# Patient Record
Sex: MALE | Race: BLACK OR AFRICAN AMERICAN | Employment: FULL TIME | URBAN - METROPOLITAN AREA
[De-identification: names, ages, dates, MRNs, and addresses within clinical notes are randomized per-mention and may not be internally consistent; named-entity substitution may affect disease eponyms.]

---

## 2017-12-26 PROBLEM — J69.0 ACUTE ASPIRATION PNEUMONIA (HCC): Status: ACTIVE | Noted: 2017-01-01

## 2018-01-01 ENCOUNTER — HOSPITAL ENCOUNTER (OUTPATIENT)
Dept: WOUND CARE | Age: 68
Discharge: HOME | End: 2018-05-23
Attending: SURGERY | Admitting: SURGERY

## 2018-01-01 ENCOUNTER — TELEPHONE (OUTPATIENT)
Dept: ENT CLINIC | Age: 68
End: 2018-01-01

## 2018-01-01 ENCOUNTER — APPOINTMENT (OUTPATIENT)
Dept: GENERAL RADIOLOGY | Age: 68
DRG: 280 | End: 2018-01-01
Payer: MEDICARE

## 2018-01-01 ENCOUNTER — TELEPHONE (OUTPATIENT)
Dept: CARDIOLOGY CLINIC | Age: 68
End: 2018-01-01

## 2018-01-01 ENCOUNTER — HOSPITAL ENCOUNTER (INPATIENT)
Dept: TELEMETRY | Age: 68
LOS: 4 days | Discharge: NURSING, REHAB OR LONG-TERM CARE | DRG: 280 | End: 2018-07-17
Attending: EMERGENCY MEDICINE | Admitting: INTERNAL MEDICINE
Payer: MEDICARE

## 2018-01-01 ENCOUNTER — OFFICE VISIT (OUTPATIENT)
Dept: ENT CLINIC | Age: 68
End: 2018-01-01

## 2018-01-01 ENCOUNTER — HOSPITAL ENCOUNTER (OUTPATIENT)
Dept: WOUND CARE | Age: 68
Discharge: HOME | End: 2018-04-10
Attending: SURGERY | Admitting: SURGERY

## 2018-01-01 ENCOUNTER — HOSPITAL ENCOUNTER (OUTPATIENT)
Dept: WOUND CARE | Age: 68
Discharge: HOME | End: 2018-04-19
Attending: INTERNAL MEDICINE | Admitting: INTERNAL MEDICINE

## 2018-01-01 ENCOUNTER — HOSPITAL ENCOUNTER (OUTPATIENT)
Dept: WOUND CARE | Age: 68
Discharge: HOME | End: 2018-04-18
Attending: SURGERY | Admitting: SURGERY

## 2018-01-01 VITALS
HEIGHT: 72 IN | BODY MASS INDEX: 33.89 KG/M2 | RESPIRATION RATE: 18 BRPM | SYSTOLIC BLOOD PRESSURE: 131 MMHG | HEART RATE: 91 BPM | DIASTOLIC BLOOD PRESSURE: 74 MMHG | OXYGEN SATURATION: 97 % | WEIGHT: 250.22 LBS | TEMPERATURE: 98.9 F

## 2018-01-01 VITALS
TEMPERATURE: 97 F | HEART RATE: 64 BPM | BODY MASS INDEX: 39.65 KG/M2 | SYSTOLIC BLOOD PRESSURE: 170 MMHG | RESPIRATION RATE: 22 BRPM | WEIGHT: 277 LBS | DIASTOLIC BLOOD PRESSURE: 77 MMHG | HEIGHT: 70 IN

## 2018-01-01 VITALS
WEIGHT: 201 LBS | HEIGHT: 70 IN | SYSTOLIC BLOOD PRESSURE: 155 MMHG | HEART RATE: 54 BPM | DIASTOLIC BLOOD PRESSURE: 70 MMHG | BODY MASS INDEX: 28.77 KG/M2

## 2018-01-01 VITALS
TEMPERATURE: 97.7 F | RESPIRATION RATE: 20 BRPM | SYSTOLIC BLOOD PRESSURE: 156 MMHG | HEART RATE: 63 BPM | DIASTOLIC BLOOD PRESSURE: 68 MMHG

## 2018-01-01 DIAGNOSIS — L89.153 DECUBITUS ULCER OF SACRAL REGION, STAGE 3 (HCC): ICD-10-CM

## 2018-01-01 DIAGNOSIS — R09.02 HYPOXIA: ICD-10-CM

## 2018-01-01 DIAGNOSIS — R13.14 PHARYNGOESOPHAGEAL DYSPHAGIA: ICD-10-CM

## 2018-01-01 DIAGNOSIS — J38.01 VOCAL CORD PARALYSIS, UNILATERAL COMPLETE: Primary | ICD-10-CM

## 2018-01-01 DIAGNOSIS — I21.4 NSTEMI (NON-ST ELEVATED MYOCARDIAL INFARCTION) (HCC): ICD-10-CM

## 2018-01-01 DIAGNOSIS — I50.9 CONGESTIVE HEART FAILURE, UNSPECIFIED CONGESTIVE HEART FAILURE CHRONICITY, UNSPECIFIED CONGESTIVE HEART FAILURE TYPE: ICD-10-CM

## 2018-01-01 DIAGNOSIS — R41.82 ALTERED MENTAL STATUS, UNSPECIFIED ALTERED MENTAL STATUS TYPE: Primary | ICD-10-CM

## 2018-01-01 LAB
A/G RATIO: 0.6 (ref 1.1–2.2)
A/G RATIO: 0.6 (ref 1.1–2.2)
ACETAMINOPHEN LEVEL: <5 UG/ML (ref 10–30)
ALBUMIN SERPL-MCNC: 3.1 G/DL (ref 3.4–5)
ALBUMIN SERPL-MCNC: 3.4 G/DL (ref 3.4–5)
ALBUMIN SERPL-MCNC: 3.4 G/DL (ref 3.4–5)
ALP BLD-CCNC: 106 U/L (ref 40–129)
ALP BLD-CCNC: 110 U/L (ref 40–129)
ALP BLD-CCNC: 91 U/L (ref 40–129)
ALT SERPL-CCNC: 13 U/L (ref 10–40)
ALT SERPL-CCNC: 15 U/L (ref 10–40)
ALT SERPL-CCNC: 16 U/L (ref 10–40)
AMMONIA: 36 UMOL/L (ref 16–60)
AMPHETAMINE SCREEN, URINE: NORMAL
ANION GAP SERPL CALCULATED.3IONS-SCNC: 12 MMOL/L (ref 3–16)
ANION GAP SERPL CALCULATED.3IONS-SCNC: 14 MMOL/L (ref 3–16)
AST SERPL-CCNC: 16 U/L (ref 15–37)
AST SERPL-CCNC: 18 U/L (ref 15–37)
AST SERPL-CCNC: 25 U/L (ref 15–37)
BARBITURATE SCREEN URINE: NORMAL
BASE EXCESS VENOUS: 14 (ref -3–3)
BASOPHILS ABSOLUTE: 0 K/UL (ref 0–0.2)
BASOPHILS ABSOLUTE: 0.1 K/UL (ref 0–0.2)
BASOPHILS RELATIVE PERCENT: 0.3 %
BASOPHILS RELATIVE PERCENT: 0.4 %
BASOPHILS RELATIVE PERCENT: 0.6 %
BASOPHILS RELATIVE PERCENT: 1.2 %
BENZODIAZEPINE SCREEN, URINE: NORMAL
BILIRUB SERPL-MCNC: 0.3 MG/DL (ref 0–1)
BILIRUBIN DIRECT: <0.2 MG/DL (ref 0–0.3)
BILIRUBIN DIRECT: <0.2 MG/DL (ref 0–0.3)
BILIRUBIN URINE: NEGATIVE
BILIRUBIN, INDIRECT: ABNORMAL MG/DL (ref 0–1)
BILIRUBIN, INDIRECT: NORMAL MG/DL (ref 0–1)
BLOOD CULTURE, ROUTINE: NORMAL
BLOOD, URINE: ABNORMAL
BUN BLDV-MCNC: 78 MG/DL (ref 7–20)
BUN BLDV-MCNC: 80 MG/DL (ref 7–20)
BUN BLDV-MCNC: 80 MG/DL (ref 7–20)
BUN BLDV-MCNC: 81 MG/DL (ref 7–20)
C-REACTIVE PROTEIN: 23.4 MG/L (ref 0–5.1)
CALCIUM SERPL-MCNC: 9.7 MG/DL (ref 8.3–10.6)
CALCIUM SERPL-MCNC: 9.9 MG/DL (ref 8.3–10.6)
CANNABINOID SCREEN URINE: NORMAL
CHLORIDE BLD-SCNC: 100 MMOL/L (ref 99–110)
CHLORIDE BLD-SCNC: 102 MMOL/L (ref 99–110)
CHLORIDE BLD-SCNC: 97 MMOL/L (ref 99–110)
CHLORIDE BLD-SCNC: 98 MMOL/L (ref 99–110)
CLARITY: CLEAR
CO2: 31 MMOL/L (ref 21–32)
CO2: 31 MMOL/L (ref 21–32)
CO2: 32 MMOL/L (ref 21–32)
CO2: 33 MMOL/L (ref 21–32)
COCAINE METABOLITE SCREEN URINE: NORMAL
COLOR: YELLOW
CREAT SERPL-MCNC: 1.4 MG/DL (ref 0.8–1.3)
CREAT SERPL-MCNC: 1.5 MG/DL (ref 0.8–1.3)
CREAT SERPL-MCNC: 1.5 MG/DL (ref 0.8–1.3)
CREAT SERPL-MCNC: 1.6 MG/DL (ref 0.8–1.3)
CREATININE URINE: 33 MG/DL (ref 39–259)
CULTURE, BLOOD 2: NORMAL
D DIMER: 668 NG/ML DDU (ref 0–229)
DIGOXIN LEVEL: 1.3 NG/ML (ref 0.8–2)
EKG ATRIAL RATE: 252 BPM
EKG ATRIAL RATE: 256 BPM
EKG DIAGNOSIS: NORMAL
EKG DIAGNOSIS: NORMAL
EKG P AXIS: -76 DEGREES
EKG P AXIS: -86 DEGREES
EKG Q-T INTERVAL: 272 MS
EKG Q-T INTERVAL: 342 MS
EKG QRS DURATION: 78 MS
EKG QRS DURATION: 88 MS
EKG QTC CALCULATION (BAZETT): 349 MS
EKG QTC CALCULATION (BAZETT): 364 MS
EKG R AXIS: -34 DEGREES
EKG R AXIS: -50 DEGREES
EKG T AXIS: 255 DEGREES
EKG T AXIS: 270 DEGREES
EKG VENTRICULAR RATE: 108 BPM
EKG VENTRICULAR RATE: 63 BPM
EOSINOPHILS ABSOLUTE: 0.2 K/UL (ref 0–0.6)
EOSINOPHILS ABSOLUTE: 0.2 K/UL (ref 0–0.6)
EOSINOPHILS ABSOLUTE: 0.3 K/UL (ref 0–0.6)
EOSINOPHILS ABSOLUTE: 0.3 K/UL (ref 0–0.6)
EOSINOPHILS RELATIVE PERCENT: 2.3 %
EOSINOPHILS RELATIVE PERCENT: 2.6 %
EOSINOPHILS RELATIVE PERCENT: 3.2 %
EOSINOPHILS RELATIVE PERCENT: 4.2 %
ETHANOL: NORMAL MG/DL (ref 0–0.08)
GFR AFRICAN AMERICAN: 52
GFR AFRICAN AMERICAN: 56
GFR AFRICAN AMERICAN: 56
GFR AFRICAN AMERICAN: >60
GFR NON-AFRICAN AMERICAN: 43
GFR NON-AFRICAN AMERICAN: 47
GFR NON-AFRICAN AMERICAN: 47
GFR NON-AFRICAN AMERICAN: 51
GLOBULIN: 5.6 G/DL
GLOBULIN: 6.1 G/DL
GLUCOSE BLD-MCNC: 101 MG/DL (ref 70–99)
GLUCOSE BLD-MCNC: 102 MG/DL (ref 70–99)
GLUCOSE BLD-MCNC: 121 MG/DL (ref 70–99)
GLUCOSE BLD-MCNC: 146 MG/DL (ref 70–99)
GLUCOSE BLD-MCNC: 167 MG/DL (ref 70–99)
GLUCOSE BLD-MCNC: 177 MG/DL (ref 70–99)
GLUCOSE BLD-MCNC: 179 MG/DL (ref 70–99)
GLUCOSE BLD-MCNC: 180 MG/DL (ref 70–99)
GLUCOSE BLD-MCNC: 184 MG/DL (ref 70–99)
GLUCOSE BLD-MCNC: 184 MG/DL (ref 70–99)
GLUCOSE BLD-MCNC: 188 MG/DL (ref 70–99)
GLUCOSE BLD-MCNC: 188 MG/DL (ref 70–99)
GLUCOSE BLD-MCNC: 190 MG/DL (ref 70–99)
GLUCOSE BLD-MCNC: 194 MG/DL (ref 70–99)
GLUCOSE BLD-MCNC: 215 MG/DL (ref 70–99)
GLUCOSE BLD-MCNC: 223 MG/DL (ref 70–99)
GLUCOSE BLD-MCNC: 237 MG/DL (ref 70–99)
GLUCOSE BLD-MCNC: 243 MG/DL (ref 70–99)
GLUCOSE BLD-MCNC: 78 MG/DL (ref 70–99)
GLUCOSE BLD-MCNC: 83 MG/DL (ref 70–99)
GLUCOSE BLD-MCNC: 97 MG/DL (ref 70–99)
GLUCOSE URINE: NEGATIVE MG/DL
HCO3 VENOUS: 37.3 MMOL/L (ref 23–29)
HCT VFR BLD CALC: 25.2 % (ref 40.5–52.5)
HCT VFR BLD CALC: 25.5 % (ref 40.5–52.5)
HCT VFR BLD CALC: 27.4 % (ref 40.5–52.5)
HCT VFR BLD CALC: 27.4 % (ref 40.5–52.5)
HEMOGLOBIN: 8.2 G/DL (ref 13.5–17.5)
HEMOGLOBIN: 8.4 G/DL (ref 13.5–17.5)
HEMOGLOBIN: 8.6 G/DL (ref 13.5–17.5)
HEMOGLOBIN: 8.8 G/DL (ref 13.5–17.5)
INR BLD: 1.48 (ref 0.86–1.14)
INR BLD: 1.73 (ref 0.86–1.14)
KETONES, URINE: NEGATIVE MG/DL
LACTATE: 1.45 MMOL/L (ref 0.4–2)
LACTIC ACID: 1.1 MMOL/L (ref 0.4–2)
LACTIC ACID: 1.3 MMOL/L (ref 0.4–2)
LEUKOCYTE ESTERASE, URINE: NEGATIVE
LIPASE: 74 U/L (ref 13–60)
LV EF: 40 %
LVEF MODALITY: NORMAL
LYMPHOCYTES ABSOLUTE: 1.5 K/UL (ref 1–5.1)
LYMPHOCYTES ABSOLUTE: 1.6 K/UL (ref 1–5.1)
LYMPHOCYTES ABSOLUTE: 1.8 K/UL (ref 1–5.1)
LYMPHOCYTES ABSOLUTE: 2.4 K/UL (ref 1–5.1)
LYMPHOCYTES RELATIVE PERCENT: 17.6 %
LYMPHOCYTES RELATIVE PERCENT: 20.7 %
LYMPHOCYTES RELATIVE PERCENT: 22.1 %
LYMPHOCYTES RELATIVE PERCENT: 25.3 %
Lab: NORMAL
MAGNESIUM: 2.3 MG/DL (ref 1.8–2.4)
MAGNESIUM: 2.3 MG/DL (ref 1.8–2.4)
MCH RBC QN AUTO: 26.5 PG (ref 26–34)
MCH RBC QN AUTO: 26.5 PG (ref 26–34)
MCH RBC QN AUTO: 26.9 PG (ref 26–34)
MCH RBC QN AUTO: 27 PG (ref 26–34)
MCHC RBC AUTO-ENTMCNC: 31.5 G/DL (ref 31–36)
MCHC RBC AUTO-ENTMCNC: 32 G/DL (ref 31–36)
MCHC RBC AUTO-ENTMCNC: 32.6 G/DL (ref 31–36)
MCHC RBC AUTO-ENTMCNC: 32.9 G/DL (ref 31–36)
MCV RBC AUTO: 82.3 FL (ref 80–100)
MCV RBC AUTO: 82.3 FL (ref 80–100)
MCV RBC AUTO: 82.9 FL (ref 80–100)
MCV RBC AUTO: 83.9 FL (ref 80–100)
METHADONE SCREEN, URINE: NORMAL
MICROSCOPIC EXAMINATION: YES
MONOCYTES ABSOLUTE: 0.6 K/UL (ref 0–1.3)
MONOCYTES ABSOLUTE: 0.6 K/UL (ref 0–1.3)
MONOCYTES ABSOLUTE: 0.7 K/UL (ref 0–1.3)
MONOCYTES ABSOLUTE: 0.7 K/UL (ref 0–1.3)
MONOCYTES RELATIVE PERCENT: 6.8 %
MONOCYTES RELATIVE PERCENT: 7.8 %
MONOCYTES RELATIVE PERCENT: 8.2 %
MONOCYTES RELATIVE PERCENT: 8.9 %
NEUTROPHILS ABSOLUTE: 5.3 K/UL (ref 1.7–7.7)
NEUTROPHILS ABSOLUTE: 5.3 K/UL (ref 1.7–7.7)
NEUTROPHILS ABSOLUTE: 6 K/UL (ref 1.7–7.7)
NEUTROPHILS ABSOLUTE: 6.1 K/UL (ref 1.7–7.7)
NEUTROPHILS RELATIVE PERCENT: 64.4 %
NEUTROPHILS RELATIVE PERCENT: 65.3 %
NEUTROPHILS RELATIVE PERCENT: 67.5 %
NEUTROPHILS RELATIVE PERCENT: 70.6 %
NITRITE, URINE: NEGATIVE
O2 SAT, VEN: 84 %
OPIATE SCREEN URINE: NORMAL
OXYCODONE URINE: NORMAL
PCO2, VEN: 47.5 MM HG (ref 40–50)
PDW BLD-RTO: 18 % (ref 12.4–15.4)
PDW BLD-RTO: 18.1 % (ref 12.4–15.4)
PDW BLD-RTO: 18.2 % (ref 12.4–15.4)
PDW BLD-RTO: 18.5 % (ref 12.4–15.4)
PERFORMED ON: ABNORMAL
PERFORMED ON: NORMAL
PH UA: 7
PH UA: 7
PH VENOUS: 7.5 (ref 7.35–7.45)
PHENCYCLIDINE SCREEN URINE: NORMAL
PHOSPHORUS: 3.4 MG/DL (ref 2.5–4.9)
PLATELET # BLD: 184 K/UL (ref 135–450)
PLATELET # BLD: 184 K/UL (ref 135–450)
PLATELET # BLD: 198 K/UL (ref 135–450)
PLATELET # BLD: 219 K/UL (ref 135–450)
PMV BLD AUTO: 10.1 FL (ref 5–10.5)
PMV BLD AUTO: 10.1 FL (ref 5–10.5)
PMV BLD AUTO: 10.4 FL (ref 5–10.5)
PMV BLD AUTO: 10.6 FL (ref 5–10.5)
PO2, VEN: 45 MM HG
POC SAMPLE TYPE: ABNORMAL
POC SAMPLE TYPE: NORMAL
POTASSIUM REFLEX MAGNESIUM: 4.2 MMOL/L (ref 3.5–5.1)
POTASSIUM REFLEX MAGNESIUM: 4.3 MMOL/L (ref 3.5–5.1)
POTASSIUM REFLEX MAGNESIUM: 5.1 MMOL/L (ref 3.5–5.1)
POTASSIUM REFLEX MAGNESIUM: 5.2 MMOL/L (ref 3.5–5.1)
POTASSIUM REFLEX MAGNESIUM: 5.2 MMOL/L (ref 3.5–5.1)
PRO-BNP: 1835 PG/ML (ref 0–124)
PRO-BNP: 1851 PG/ML (ref 0–124)
PRO-BNP: 2781 PG/ML (ref 0–124)
PRO-BNP: 3035 PG/ML (ref 0–124)
PROCALCITONIN: 0.71 NG/ML (ref 0–0.15)
PROPOXYPHENE SCREEN: NORMAL
PROTEIN PROTEIN: 67.9 MG/DL
PROTEIN UA: 100 MG/DL
PROTHROMBIN TIME: 16.9 SEC (ref 9.8–13)
PROTHROMBIN TIME: 19.7 SEC (ref 9.8–13)
RBC # BLD: 3.06 M/UL (ref 4.2–5.9)
RBC # BLD: 3.09 M/UL (ref 4.2–5.9)
RBC # BLD: 3.27 M/UL (ref 4.2–5.9)
RBC # BLD: 3.3 M/UL (ref 4.2–5.9)
RBC UA: ABNORMAL /HPF (ref 0–2)
SALICYLATE, SERUM: <0.3 MG/DL (ref 15–30)
SEDIMENTATION RATE, ERYTHROCYTE: >120 MM/HR (ref 0–20)
SODIUM BLD-SCNC: 140 MMOL/L (ref 136–145)
SODIUM BLD-SCNC: 142 MMOL/L (ref 136–145)
SODIUM BLD-SCNC: 145 MMOL/L (ref 136–145)
SODIUM BLD-SCNC: 147 MMOL/L (ref 136–145)
SPECIFIC GRAVITY UA: 1.01
T4 FREE: 1.3 NG/DL (ref 0.9–1.8)
TCO2 CALC VENOUS: 39 MMOL/L
TOTAL CK: 110 U/L (ref 39–308)
TOTAL PROTEIN: 8.1 G/DL (ref 6.4–8.2)
TOTAL PROTEIN: 9 G/DL (ref 6.4–8.2)
TOTAL PROTEIN: 9.5 G/DL (ref 6.4–8.2)
TROPONIN: 0.29 NG/ML
TROPONIN: 0.31 NG/ML
TROPONIN: 0.4 NG/ML
TSH SERPL DL<=0.05 MIU/L-ACNC: 1.45 UIU/ML (ref 0.27–4.2)
URINE CULTURE, ROUTINE: NORMAL
URINE CULTURE, ROUTINE: NORMAL
URINE TYPE: ABNORMAL
UROBILINOGEN, URINE: 0.2 E.U./DL
WBC # BLD: 7.9 K/UL (ref 4–11)
WBC # BLD: 8.2 K/UL (ref 4–11)
WBC # BLD: 8.4 K/UL (ref 4–11)
WBC # BLD: 9.4 K/UL (ref 4–11)
WBC UA: ABNORMAL /HPF (ref 0–5)

## 2018-01-01 PROCEDURE — 85652 RBC SED RATE AUTOMATED: CPT

## 2018-01-01 PROCEDURE — 6360000002 HC RX W HCPCS: Performed by: INTERNAL MEDICINE

## 2018-01-01 PROCEDURE — 83880 ASSAY OF NATRIURETIC PEPTIDE: CPT

## 2018-01-01 PROCEDURE — 1200000000 HC SEMI PRIVATE

## 2018-01-01 PROCEDURE — 71250 CT THORAX DX C-: CPT

## 2018-01-01 PROCEDURE — 93005 ELECTROCARDIOGRAM TRACING: CPT

## 2018-01-01 PROCEDURE — 6360000004 HC RX CONTRAST MEDICATION: Performed by: INTERNAL MEDICINE

## 2018-01-01 PROCEDURE — 97530 THERAPEUTIC ACTIVITIES: CPT

## 2018-01-01 PROCEDURE — 84484 ASSAY OF TROPONIN QUANT: CPT

## 2018-01-01 PROCEDURE — G8983 BODY POS D/C STATUS: HCPCS

## 2018-01-01 PROCEDURE — C9113 INJ PANTOPRAZOLE SODIUM, VIA: HCPCS

## 2018-01-01 PROCEDURE — 97165 OT EVAL LOW COMPLEX 30 MIN: CPT

## 2018-01-01 PROCEDURE — G8982 BODY POS GOAL STATUS: HCPCS

## 2018-01-01 PROCEDURE — 70498 CT ANGIOGRAPHY NECK: CPT

## 2018-01-01 PROCEDURE — 2700000000 HC OXYGEN THERAPY PER DAY

## 2018-01-01 PROCEDURE — 93005 ELECTROCARDIOGRAM TRACING: CPT | Performed by: STUDENT IN AN ORGANIZED HEALTH CARE EDUCATION/TRAINING PROGRAM

## 2018-01-01 PROCEDURE — 71045 X-RAY EXAM CHEST 1 VIEW: CPT

## 2018-01-01 PROCEDURE — 87086 URINE CULTURE/COLONY COUNT: CPT

## 2018-01-01 PROCEDURE — 9990 CHARGE CONVERSION

## 2018-01-01 PROCEDURE — 84145 PROCALCITONIN (PCT): CPT

## 2018-01-01 PROCEDURE — 70496 CT ANGIOGRAPHY HEAD: CPT

## 2018-01-01 PROCEDURE — 36415 COLL VENOUS BLD VENIPUNCTURE: CPT

## 2018-01-01 PROCEDURE — 85610 PROTHROMBIN TIME: CPT

## 2018-01-01 PROCEDURE — 80048 BASIC METABOLIC PNL TOTAL CA: CPT

## 2018-01-01 PROCEDURE — 6370000000 HC RX 637 (ALT 250 FOR IP): Performed by: STUDENT IN AN ORGANIZED HEALTH CARE EDUCATION/TRAINING PROGRAM

## 2018-01-01 PROCEDURE — 80053 COMPREHEN METABOLIC PANEL: CPT

## 2018-01-01 PROCEDURE — 82803 BLOOD GASES ANY COMBINATION: CPT

## 2018-01-01 PROCEDURE — 2580000003 HC RX 258: Performed by: STUDENT IN AN ORGANIZED HEALTH CARE EDUCATION/TRAINING PROGRAM

## 2018-01-01 PROCEDURE — 84100 ASSAY OF PHOSPHORUS: CPT

## 2018-01-01 PROCEDURE — C9113 INJ PANTOPRAZOLE SODIUM, VIA: HCPCS | Performed by: STUDENT IN AN ORGANIZED HEALTH CARE EDUCATION/TRAINING PROGRAM

## 2018-01-01 PROCEDURE — 99233 SBSQ HOSP IP/OBS HIGH 50: CPT | Performed by: INTERNAL MEDICINE

## 2018-01-01 PROCEDURE — 6360000002 HC RX W HCPCS: Performed by: STUDENT IN AN ORGANIZED HEALTH CARE EDUCATION/TRAINING PROGRAM

## 2018-01-01 PROCEDURE — 82550 ASSAY OF CK (CPK): CPT

## 2018-01-01 PROCEDURE — 6370000000 HC RX 637 (ALT 250 FOR IP): Performed by: INTERNAL MEDICINE

## 2018-01-01 PROCEDURE — 85379 FIBRIN DEGRADATION QUANT: CPT

## 2018-01-01 PROCEDURE — 85025 COMPLETE CBC W/AUTO DIFF WBC: CPT

## 2018-01-01 PROCEDURE — 51702 INSERT TEMP BLADDER CATH: CPT

## 2018-01-01 PROCEDURE — 82140 ASSAY OF AMMONIA: CPT

## 2018-01-01 PROCEDURE — 84132 ASSAY OF SERUM POTASSIUM: CPT

## 2018-01-01 PROCEDURE — 80162 ASSAY OF DIGOXIN TOTAL: CPT

## 2018-01-01 PROCEDURE — 5A09457 ASSISTANCE WITH RESPIRATORY VENTILATION, 24-96 CONSECUTIVE HOURS, CONTINUOUS POSITIVE AIRWAY PRESSURE: ICD-10-PCS | Performed by: INTERNAL MEDICINE

## 2018-01-01 PROCEDURE — 94664 DEMO&/EVAL PT USE INHALER: CPT

## 2018-01-01 PROCEDURE — 80307 DRUG TEST PRSMV CHEM ANLYZR: CPT

## 2018-01-01 PROCEDURE — 84439 ASSAY OF FREE THYROXINE: CPT

## 2018-01-01 PROCEDURE — 83735 ASSAY OF MAGNESIUM: CPT

## 2018-01-01 PROCEDURE — 99223 1ST HOSP IP/OBS HIGH 75: CPT | Performed by: INTERNAL MEDICINE

## 2018-01-01 PROCEDURE — 94761 N-INVAS EAR/PLS OXIMETRY MLT: CPT

## 2018-01-01 PROCEDURE — 73630 X-RAY EXAM OF FOOT: CPT

## 2018-01-01 PROCEDURE — 31720 CLEARANCE OF AIRWAYS: CPT

## 2018-01-01 PROCEDURE — 97110 THERAPEUTIC EXERCISES: CPT

## 2018-01-01 PROCEDURE — 31575 DIAGNOSTIC LARYNGOSCOPY: CPT | Performed by: OTOLARYNGOLOGY

## 2018-01-01 PROCEDURE — 94640 AIRWAY INHALATION TREATMENT: CPT

## 2018-01-01 PROCEDURE — G8981 BODY POS CURRENT STATUS: HCPCS

## 2018-01-01 PROCEDURE — 81001 URINALYSIS AUTO W/SCOPE: CPT

## 2018-01-01 PROCEDURE — 99285 EMERGENCY DEPT VISIT HI MDM: CPT

## 2018-01-01 PROCEDURE — C8929 TTE W OR WO FOL WCON,DOPPLER: HCPCS | Performed by: INTERNAL MEDICINE

## 2018-01-01 PROCEDURE — 97162 PT EVAL MOD COMPLEX 30 MIN: CPT

## 2018-01-01 PROCEDURE — G8988 SELF CARE GOAL STATUS: HCPCS

## 2018-01-01 PROCEDURE — 83605 ASSAY OF LACTIC ACID: CPT

## 2018-01-01 PROCEDURE — 86140 C-REACTIVE PROTEIN: CPT

## 2018-01-01 PROCEDURE — 87040 BLOOD CULTURE FOR BACTERIA: CPT

## 2018-01-01 PROCEDURE — 99203 OFFICE O/P NEW LOW 30 MIN: CPT | Performed by: OTOLARYNGOLOGY

## 2018-01-01 PROCEDURE — 84443 ASSAY THYROID STIM HORMONE: CPT

## 2018-01-01 PROCEDURE — G8989 SELF CARE D/C STATUS: HCPCS

## 2018-01-01 PROCEDURE — 70450 CT HEAD/BRAIN W/O DYE: CPT

## 2018-01-01 PROCEDURE — 11042 DBRDMT SUBQ TIS 1ST 20SQCM/<: CPT | Performed by: INTERNAL MEDICINE

## 2018-01-01 PROCEDURE — 82570 ASSAY OF URINE CREATININE: CPT

## 2018-01-01 PROCEDURE — G8987 SELF CARE CURRENT STATUS: HCPCS

## 2018-01-01 PROCEDURE — 83690 ASSAY OF LIPASE: CPT

## 2018-01-01 PROCEDURE — G0480 DRUG TEST DEF 1-7 CLASSES: HCPCS

## 2018-01-01 PROCEDURE — 84156 ASSAY OF PROTEIN URINE: CPT

## 2018-01-01 RX ORDER — NICOTINE POLACRILEX 4 MG
15 LOZENGE BUCCAL PRN
Status: DISCONTINUED | OUTPATIENT
Start: 2018-01-01 | End: 2018-01-01 | Stop reason: HOSPADM

## 2018-01-01 RX ORDER — SODIUM CHLORIDE 0.9 % (FLUSH) 0.9 %
10 SYRINGE (ML) INJECTION PRN
Status: DISCONTINUED | OUTPATIENT
Start: 2018-01-01 | End: 2018-01-01 | Stop reason: HOSPADM

## 2018-01-01 RX ORDER — DEXTROSE MONOHYDRATE 50 MG/ML
100 INJECTION, SOLUTION INTRAVENOUS PRN
Status: DISCONTINUED | OUTPATIENT
Start: 2018-01-01 | End: 2018-01-01 | Stop reason: HOSPADM

## 2018-01-01 RX ORDER — SENNA AND DOCUSATE SODIUM 50; 8.6 MG/1; MG/1
2 TABLET, FILM COATED ORAL 2 TIMES DAILY
Status: DISCONTINUED | OUTPATIENT
Start: 2018-01-01 | End: 2018-01-01 | Stop reason: HOSPADM

## 2018-01-01 RX ORDER — ATORVASTATIN CALCIUM 40 MG/1
40 TABLET, FILM COATED ORAL NIGHTLY
COMMUNITY

## 2018-01-01 RX ORDER — ALBUTEROL SULFATE 2.5 MG/3ML
2.5 SOLUTION RESPIRATORY (INHALATION) EVERY 4 HOURS PRN
Status: DISCONTINUED | OUTPATIENT
Start: 2018-01-01 | End: 2018-01-01 | Stop reason: HOSPADM

## 2018-01-01 RX ORDER — FUROSEMIDE 40 MG/1
40 TABLET ORAL DAILY
COMMUNITY

## 2018-01-01 RX ORDER — FUROSEMIDE 40 MG/1
40 TABLET ORAL DAILY
Status: DISCONTINUED | OUTPATIENT
Start: 2018-01-01 | End: 2018-01-01

## 2018-01-01 RX ORDER — DILTIAZEM HYDROCHLORIDE 60 MG/1
60 TABLET, FILM COATED ORAL 4 TIMES DAILY
Status: DISCONTINUED | OUTPATIENT
Start: 2018-01-01 | End: 2018-01-01 | Stop reason: HOSPADM

## 2018-01-01 RX ORDER — FUROSEMIDE 10 MG/ML
40 INJECTION INTRAMUSCULAR; INTRAVENOUS 2 TIMES DAILY
Status: DISCONTINUED | OUTPATIENT
Start: 2018-01-01 | End: 2018-01-01

## 2018-01-01 RX ORDER — ATORVASTATIN CALCIUM 40 MG/1
40 TABLET, FILM COATED ORAL NIGHTLY
Status: DISCONTINUED | OUTPATIENT
Start: 2018-01-01 | End: 2018-01-01 | Stop reason: HOSPADM

## 2018-01-01 RX ORDER — OMEPRAZOLE 20 MG/1
20 CAPSULE, DELAYED RELEASE ORAL DAILY
COMMUNITY

## 2018-01-01 RX ORDER — SODIUM CHLORIDE 0.9 % (FLUSH) 0.9 %
10 SYRINGE (ML) INJECTION EVERY 12 HOURS SCHEDULED
Status: DISCONTINUED | OUTPATIENT
Start: 2018-01-01 | End: 2018-01-01 | Stop reason: HOSPADM

## 2018-01-01 RX ORDER — LISINOPRIL 2.5 MG/1
2.5 TABLET ORAL DAILY
Status: DISCONTINUED | OUTPATIENT
Start: 2018-01-01 | End: 2018-01-01 | Stop reason: HOSPADM

## 2018-01-01 RX ORDER — METOPROLOL TARTRATE 100 MG/1
100 TABLET ORAL 2 TIMES DAILY
Status: DISCONTINUED | OUTPATIENT
Start: 2018-01-01 | End: 2018-01-01 | Stop reason: HOSPADM

## 2018-01-01 RX ORDER — DOXYCYCLINE HYCLATE 50 MG/1
324 CAPSULE, GELATIN COATED ORAL 2 TIMES DAILY
COMMUNITY

## 2018-01-01 RX ORDER — LISINOPRIL 2.5 MG/1
2.5 TABLET ORAL DAILY
Qty: 30 TABLET | Refills: 0 | Status: SHIPPED | OUTPATIENT
Start: 2018-01-01

## 2018-01-01 RX ORDER — FUROSEMIDE 10 MG/ML
60 INJECTION INTRAMUSCULAR; INTRAVENOUS 2 TIMES DAILY
Status: DISCONTINUED | OUTPATIENT
Start: 2018-01-01 | End: 2018-01-01

## 2018-01-01 RX ORDER — ALBUTEROL SULFATE 2.5 MG/3ML
2.5 SOLUTION RESPIRATORY (INHALATION) EVERY 6 HOURS PRN
COMMUNITY

## 2018-01-01 RX ORDER — LIDOCAINE HYDROCHLORIDE 40 MG/ML
SOLUTION TOPICAL ONCE
Status: DISCONTINUED | OUTPATIENT
Start: 2018-01-01 | End: 2018-01-01 | Stop reason: HOSPADM

## 2018-01-01 RX ORDER — IPRATROPIUM BROMIDE AND ALBUTEROL SULFATE 2.5; .5 MG/3ML; MG/3ML
3 SOLUTION RESPIRATORY (INHALATION) EVERY 4 HOURS PRN
Status: DISCONTINUED | OUTPATIENT
Start: 2018-01-01 | End: 2018-01-01 | Stop reason: HOSPADM

## 2018-01-01 RX ORDER — ALBUTEROL SULFATE 2.5 MG/3ML
2.5 SOLUTION RESPIRATORY (INHALATION) EVERY 6 HOURS PRN
Status: DISCONTINUED | OUTPATIENT
Start: 2018-01-01 | End: 2018-01-01

## 2018-01-01 RX ORDER — ONDANSETRON 2 MG/ML
4 INJECTION INTRAMUSCULAR; INTRAVENOUS EVERY 6 HOURS PRN
Status: DISCONTINUED | OUTPATIENT
Start: 2018-01-01 | End: 2018-01-01 | Stop reason: HOSPADM

## 2018-01-01 RX ORDER — DIGOXIN 125 MCG
125 TABLET ORAL DAILY
COMMUNITY

## 2018-01-01 RX ORDER — DIGOXIN 125 MCG
125 TABLET ORAL DAILY
Status: DISCONTINUED | OUTPATIENT
Start: 2018-01-01 | End: 2018-01-01 | Stop reason: HOSPADM

## 2018-01-01 RX ORDER — POLYETHYLENE GLYCOL 3350 17 G/17G
17 POWDER, FOR SOLUTION ORAL DAILY
Status: DISCONTINUED | OUTPATIENT
Start: 2018-01-01 | End: 2018-01-01 | Stop reason: HOSPADM

## 2018-01-01 RX ORDER — PANTOPRAZOLE SODIUM 40 MG/10ML
40 INJECTION, POWDER, LYOPHILIZED, FOR SOLUTION INTRAVENOUS DAILY
Status: DISCONTINUED | OUTPATIENT
Start: 2018-01-01 | End: 2018-01-01 | Stop reason: HOSPADM

## 2018-01-01 RX ORDER — DEXTROSE MONOHYDRATE 25 G/50ML
12.5 INJECTION, SOLUTION INTRAVENOUS PRN
Status: DISCONTINUED | OUTPATIENT
Start: 2018-01-01 | End: 2018-01-01 | Stop reason: HOSPADM

## 2018-01-01 RX ORDER — DOXYCYCLINE HYCLATE 50 MG/1
324 CAPSULE, GELATIN COATED ORAL 2 TIMES DAILY
Status: DISCONTINUED | OUTPATIENT
Start: 2018-01-01 | End: 2018-01-01 | Stop reason: SDUPTHER

## 2018-01-01 RX ORDER — ACETAMINOPHEN 650 MG/1
650 SUPPOSITORY RECTAL EVERY 6 HOURS PRN
Status: DISCONTINUED | OUTPATIENT
Start: 2018-01-01 | End: 2018-01-01 | Stop reason: HOSPADM

## 2018-01-01 RX ORDER — ASPIRIN 81 MG/1
324 TABLET, CHEWABLE ORAL ONCE
Status: COMPLETED | OUTPATIENT
Start: 2018-01-01 | End: 2018-01-01

## 2018-01-01 RX ORDER — FERROUS GLUCONATE 324(37.5)
324 TABLET ORAL 2 TIMES DAILY
Status: DISCONTINUED | OUTPATIENT
Start: 2018-01-01 | End: 2018-01-01 | Stop reason: HOSPADM

## 2018-01-01 RX ORDER — ACETAMINOPHEN 160 MG/5ML
650 SOLUTION ORAL EVERY 4 HOURS PRN
Status: DISCONTINUED | OUTPATIENT
Start: 2018-01-01 | End: 2018-01-01 | Stop reason: HOSPADM

## 2018-01-01 RX ORDER — FUROSEMIDE 10 MG/ML
40 INJECTION INTRAMUSCULAR; INTRAVENOUS 2 TIMES DAILY
Status: DISCONTINUED | OUTPATIENT
Start: 2018-01-01 | End: 2018-01-01 | Stop reason: HOSPADM

## 2018-01-01 RX ADMIN — POLYETHYLENE GLYCOL (3350) 17 G: 17 POWDER, FOR SOLUTION ORAL at 09:34

## 2018-01-01 RX ADMIN — DILTIAZEM HYDROCHLORIDE 60 MG: 60 TABLET, FILM COATED ORAL at 13:16

## 2018-01-01 RX ADMIN — APIXABAN 5 MG: 5 TABLET, FILM COATED ORAL at 08:19

## 2018-01-01 RX ADMIN — DILTIAZEM HYDROCHLORIDE 60 MG: 60 TABLET, FILM COATED ORAL at 16:56

## 2018-01-01 RX ADMIN — ATORVASTATIN CALCIUM 40 MG: 40 TABLET, FILM COATED ORAL at 20:53

## 2018-01-01 RX ADMIN — DOCUSATE SODIUM,SENNOSIDES 2 TABLET: 50; 8.6 TABLET, FILM COATED ORAL at 08:41

## 2018-01-01 RX ADMIN — Medication 10 ML: at 09:31

## 2018-01-01 RX ADMIN — FERROUS GLUCONATE TAB 324 MG (37.5 MG ELEMENTAL IRON) 324 MG: 324 (37.5 FE) TAB at 20:58

## 2018-01-01 RX ADMIN — ATORVASTATIN CALCIUM 40 MG: 40 TABLET, FILM COATED ORAL at 20:58

## 2018-01-01 RX ADMIN — FERROUS GLUCONATE TAB 324 MG (37.5 MG ELEMENTAL IRON) 324 MG: 324 (37.5 FE) TAB at 08:42

## 2018-01-01 RX ADMIN — FERROUS GLUCONATE TAB 324 MG (37.5 MG ELEMENTAL IRON) 324 MG: 324 (37.5 FE) TAB at 21:27

## 2018-01-01 RX ADMIN — ASPIRIN 324 MG: 81 TABLET, CHEWABLE ORAL at 12:41

## 2018-01-01 RX ADMIN — DOCUSATE SODIUM,SENNOSIDES 2 TABLET: 50; 8.6 TABLET, FILM COATED ORAL at 09:37

## 2018-01-01 RX ADMIN — Medication 10 ML: at 20:30

## 2018-01-01 RX ADMIN — APIXABAN 5 MG: 5 TABLET, FILM COATED ORAL at 09:31

## 2018-01-01 RX ADMIN — Medication 10 ML: at 08:43

## 2018-01-01 RX ADMIN — INSULIN LISPRO 6 UNITS: 100 INJECTION, SOLUTION INTRAVENOUS; SUBCUTANEOUS at 09:01

## 2018-01-01 RX ADMIN — FUROSEMIDE 40 MG: 10 INJECTION, SOLUTION INTRAMUSCULAR; INTRAVENOUS at 09:29

## 2018-01-01 RX ADMIN — FUROSEMIDE 40 MG: 10 INJECTION, SOLUTION INTRAMUSCULAR; INTRAVENOUS at 09:31

## 2018-01-01 RX ADMIN — METOPROLOL TARTRATE 100 MG: 100 TABLET ORAL at 20:57

## 2018-01-01 RX ADMIN — DIGOXIN 125 MCG: 125 TABLET ORAL at 09:31

## 2018-01-01 RX ADMIN — APIXABAN 5 MG: 5 TABLET, FILM COATED ORAL at 20:54

## 2018-01-01 RX ADMIN — ACETAMINOPHEN 650 MG: 650 SOLUTION ORAL at 15:46

## 2018-01-01 RX ADMIN — POLYETHYLENE GLYCOL (3350) 17 G: 17 POWDER, FOR SOLUTION ORAL at 09:45

## 2018-01-01 RX ADMIN — PANTOPRAZOLE SODIUM 40 MG: 40 INJECTION, POWDER, FOR SOLUTION INTRAVENOUS at 11:04

## 2018-01-01 RX ADMIN — DOCUSATE SODIUM,SENNOSIDES 2 TABLET: 50; 8.6 TABLET, FILM COATED ORAL at 20:58

## 2018-01-01 RX ADMIN — FUROSEMIDE 40 MG: 10 INJECTION, SOLUTION INTRAMUSCULAR; INTRAVENOUS at 17:49

## 2018-01-01 RX ADMIN — DILTIAZEM HYDROCHLORIDE 60 MG: 60 TABLET, FILM COATED ORAL at 09:31

## 2018-01-01 RX ADMIN — FUROSEMIDE 40 MG: 10 INJECTION, SOLUTION INTRAMUSCULAR; INTRAVENOUS at 20:30

## 2018-01-01 RX ADMIN — DILTIAZEM HYDROCHLORIDE 60 MG: 60 TABLET, FILM COATED ORAL at 09:28

## 2018-01-01 RX ADMIN — PANTOPRAZOLE SODIUM 40 MG: 40 INJECTION, POWDER, FOR SOLUTION INTRAVENOUS at 08:26

## 2018-01-01 RX ADMIN — DILTIAZEM HYDROCHLORIDE 60 MG: 60 TABLET, FILM COATED ORAL at 13:57

## 2018-01-01 RX ADMIN — DILTIAZEM HYDROCHLORIDE 60 MG: 60 TABLET, FILM COATED ORAL at 20:58

## 2018-01-01 RX ADMIN — INSULIN LISPRO 4 UNITS: 100 INJECTION, SOLUTION INTRAVENOUS; SUBCUTANEOUS at 23:32

## 2018-01-01 RX ADMIN — DOCUSATE SODIUM,SENNOSIDES 2 TABLET: 50; 8.6 TABLET, FILM COATED ORAL at 08:26

## 2018-01-01 RX ADMIN — IPRATROPIUM BROMIDE AND ALBUTEROL SULFATE 3 ML: .5; 3 SOLUTION RESPIRATORY (INHALATION) at 12:47

## 2018-01-01 RX ADMIN — Medication 10 ML: at 21:36

## 2018-01-01 RX ADMIN — FERROUS GLUCONATE TAB 324 MG (37.5 MG ELEMENTAL IRON) 324 MG: 324 (37.5 FE) TAB at 09:31

## 2018-01-01 RX ADMIN — METOPROLOL TARTRATE 100 MG: 100 TABLET ORAL at 08:19

## 2018-01-01 RX ADMIN — FERROUS GLUCONATE TAB 324 MG (37.5 MG ELEMENTAL IRON) 324 MG: 324 (37.5 FE) TAB at 08:19

## 2018-01-01 RX ADMIN — FERROUS GLUCONATE TAB 324 MG (37.5 MG ELEMENTAL IRON) 324 MG: 324 (37.5 FE) TAB at 20:27

## 2018-01-01 RX ADMIN — METOPROLOL TARTRATE 100 MG: 100 TABLET ORAL at 20:53

## 2018-01-01 RX ADMIN — FUROSEMIDE 40 MG: 10 INJECTION, SOLUTION INTRAMUSCULAR; INTRAVENOUS at 08:42

## 2018-01-01 RX ADMIN — DIGOXIN 125 MCG: 125 TABLET ORAL at 08:19

## 2018-01-01 RX ADMIN — POLYETHYLENE GLYCOL (3350) 17 G: 17 POWDER, FOR SOLUTION ORAL at 08:19

## 2018-01-01 RX ADMIN — ATORVASTATIN CALCIUM 40 MG: 40 TABLET, FILM COATED ORAL at 20:28

## 2018-01-01 RX ADMIN — DIGOXIN 125 MCG: 125 TABLET ORAL at 08:41

## 2018-01-01 RX ADMIN — APIXABAN 5 MG: 5 TABLET, FILM COATED ORAL at 09:29

## 2018-01-01 RX ADMIN — FUROSEMIDE 60 MG: 10 INJECTION, SOLUTION INTRAMUSCULAR; INTRAVENOUS at 19:01

## 2018-01-01 RX ADMIN — DILTIAZEM HYDROCHLORIDE 60 MG: 60 TABLET, FILM COATED ORAL at 23:38

## 2018-01-01 RX ADMIN — APIXABAN 5 MG: 5 TABLET, FILM COATED ORAL at 21:29

## 2018-01-01 RX ADMIN — DILTIAZEM HYDROCHLORIDE 60 MG: 60 TABLET, FILM COATED ORAL at 12:10

## 2018-01-01 RX ADMIN — METOPROLOL TARTRATE 100 MG: 100 TABLET ORAL at 09:37

## 2018-01-01 RX ADMIN — ACETAMINOPHEN 650 MG: 650 SOLUTION ORAL at 15:27

## 2018-01-01 RX ADMIN — DILTIAZEM HYDROCHLORIDE 60 MG: 60 TABLET, FILM COATED ORAL at 08:19

## 2018-01-01 RX ADMIN — APIXABAN 5 MG: 5 TABLET, FILM COATED ORAL at 20:57

## 2018-01-01 RX ADMIN — INSULIN LISPRO 4 UNITS: 100 INJECTION, SOLUTION INTRAVENOUS; SUBCUTANEOUS at 05:53

## 2018-01-01 RX ADMIN — Medication 10 ML: at 20:50

## 2018-01-01 RX ADMIN — METOPROLOL TARTRATE 100 MG: 100 TABLET ORAL at 21:30

## 2018-01-01 RX ADMIN — PANTOPRAZOLE SODIUM 40 MG: 40 INJECTION, POWDER, FOR SOLUTION INTRAVENOUS at 09:31

## 2018-01-01 RX ADMIN — FUROSEMIDE 40 MG: 10 INJECTION, SOLUTION INTRAMUSCULAR; INTRAVENOUS at 16:56

## 2018-01-01 RX ADMIN — APIXABAN 5 MG: 5 TABLET, FILM COATED ORAL at 20:29

## 2018-01-01 RX ADMIN — PANTOPRAZOLE SODIUM 40 MG: 40 INJECTION, POWDER, FOR SOLUTION INTRAVENOUS at 08:43

## 2018-01-01 RX ADMIN — POLYETHYLENE GLYCOL (3350) 17 G: 17 POWDER, FOR SOLUTION ORAL at 11:43

## 2018-01-01 RX ADMIN — METOPROLOL TARTRATE 100 MG: 100 TABLET ORAL at 09:31

## 2018-01-01 RX ADMIN — METOPROLOL TARTRATE 100 MG: 100 TABLET ORAL at 08:41

## 2018-01-01 RX ADMIN — DILTIAZEM HYDROCHLORIDE 60 MG: 60 TABLET, FILM COATED ORAL at 17:20

## 2018-01-01 RX ADMIN — DILTIAZEM HYDROCHLORIDE 60 MG: 60 TABLET, FILM COATED ORAL at 13:40

## 2018-01-01 RX ADMIN — PANTOPRAZOLE SODIUM 40 MG: 40 INJECTION, POWDER, FOR SOLUTION INTRAVENOUS at 20:30

## 2018-01-01 RX ADMIN — DIGOXIN 125 MCG: 125 TABLET ORAL at 09:28

## 2018-01-01 RX ADMIN — APIXABAN 5 MG: 5 TABLET, FILM COATED ORAL at 08:43

## 2018-01-01 RX ADMIN — ATORVASTATIN CALCIUM 40 MG: 40 TABLET, FILM COATED ORAL at 21:26

## 2018-01-01 RX ADMIN — PERFLUTREN 2.2 MG: 6.52 INJECTION, SUSPENSION INTRAVENOUS at 11:45

## 2018-01-01 RX ADMIN — DOCUSATE SODIUM,SENNOSIDES 2 TABLET: 50; 8.6 TABLET, FILM COATED ORAL at 21:33

## 2018-01-01 RX ADMIN — FERROUS GLUCONATE TAB 324 MG (37.5 MG ELEMENTAL IRON) 324 MG: 324 (37.5 FE) TAB at 20:52

## 2018-01-01 RX ADMIN — DILTIAZEM HYDROCHLORIDE 60 MG: 60 TABLET, FILM COATED ORAL at 08:41

## 2018-01-01 RX ADMIN — LISINOPRIL 2.5 MG: 2.5 TABLET ORAL at 12:10

## 2018-01-01 RX ADMIN — DILTIAZEM HYDROCHLORIDE 60 MG: 60 TABLET, FILM COATED ORAL at 20:28

## 2018-01-01 RX ADMIN — FERROUS GLUCONATE TAB 324 MG (37.5 MG ELEMENTAL IRON) 324 MG: 324 (37.5 FE) TAB at 09:28

## 2018-01-01 RX ADMIN — FUROSEMIDE 60 MG: 10 INJECTION, SOLUTION INTRAMUSCULAR; INTRAVENOUS at 08:18

## 2018-01-01 RX ADMIN — Medication 10 ML: at 08:20

## 2018-01-01 RX ADMIN — DILTIAZEM HYDROCHLORIDE 60 MG: 60 TABLET, FILM COATED ORAL at 20:54

## 2018-01-01 RX ADMIN — IPRATROPIUM BROMIDE AND ALBUTEROL SULFATE 3 ML: .5; 3 SOLUTION RESPIRATORY (INHALATION) at 17:58

## 2018-01-01 RX ADMIN — DOCUSATE SODIUM,SENNOSIDES 2 TABLET: 50; 8.6 TABLET, FILM COATED ORAL at 09:31

## 2018-01-01 RX ADMIN — DOCUSATE SODIUM,SENNOSIDES 2 TABLET: 50; 8.6 TABLET, FILM COATED ORAL at 20:53

## 2018-01-01 RX ADMIN — DOCUSATE SODIUM,SENNOSIDES 2 TABLET: 50; 8.6 TABLET, FILM COATED ORAL at 20:28

## 2018-01-01 RX ADMIN — Medication 10 ML: at 21:03

## 2018-01-01 RX ADMIN — INSULIN LISPRO 4 UNITS: 100 INJECTION, SOLUTION INTRAVENOUS; SUBCUTANEOUS at 17:49

## 2018-01-01 RX ADMIN — METOPROLOL TARTRATE 100 MG: 100 TABLET ORAL at 20:29

## 2018-01-01 RX ADMIN — Medication 10 ML: at 09:29

## 2018-01-01 ASSESSMENT — PAIN SCALES - GENERAL
PAINLEVEL_OUTOF10: 0
PAINLEVEL_OUTOF10: 3
PAINLEVEL_OUTOF10: 3
PAINLEVEL_OUTOF10: 0
PAINLEVEL_OUTOF10: 0

## 2018-01-01 ASSESSMENT — PAIN SCALES - PAIN ASSESSMENT IN ADVANCED DEMENTIA (PAINAD)
FACIALEXPRESSION: 0
FACIALEXPRESSION: 0
BODYLANGUAGE: 0
BODYLANGUAGE: 0
FACIALEXPRESSION: 0
CONSOLABILITY: 0
BREATHING: 0
BODYLANGUAGE: 0
NEGVOCALIZATION: 0
FACIALEXPRESSION: 0
BREATHING: 0
FACIALEXPRESSION: 0
FACIALEXPRESSION: 0
NEGVOCALIZATION: 0
FACIALEXPRESSION: 0
CONSOLABILITY: 0
FACIALEXPRESSION: 0
TOTALSCORE: 0
BODYLANGUAGE: 0
NEGVOCALIZATION: 0
TOTALSCORE: 0
NEGVOCALIZATION: 0
CONSOLABILITY: 0
TOTALSCORE: 0
NEGVOCALIZATION: 0
NEGVOCALIZATION: 0
CONSOLABILITY: 0
TOTALSCORE: 0
TOTALSCORE: 0
FACIALEXPRESSION: 0
TOTALSCORE: 0
BREATHING: 0
BODYLANGUAGE: 0
FACIALEXPRESSION: 0
BODYLANGUAGE: 0
CONSOLABILITY: 0
TOTALSCORE: 0
FACIALEXPRESSION: 0
BODYLANGUAGE: 0
BREATHING: 0
NEGVOCALIZATION: 0
FACIALEXPRESSION: 0
BREATHING: 0
FACIALEXPRESSION: 0
FACIALEXPRESSION: 0
TOTALSCORE: 0
NEGVOCALIZATION: 0
BODYLANGUAGE: 0
CONSOLABILITY: 0
TOTALSCORE: 0
FACIALEXPRESSION: 0
BODYLANGUAGE: 0
BREATHING: 0
CONSOLABILITY: 0
CONSOLABILITY: 0
TOTALSCORE: 0
BREATHING: 0
NEGVOCALIZATION: 0
BREATHING: 0
BODYLANGUAGE: 0
TOTALSCORE: 0
TOTALSCORE: 0
FACIALEXPRESSION: 0
BODYLANGUAGE: 0
FACIALEXPRESSION: 0
BREATHING: 0
FACIALEXPRESSION: 0
TOTALSCORE: 1
NEGVOCALIZATION: 0
NEGVOCALIZATION: 0
BREATHING: 0
BREATHING: 0
TOTALSCORE: 0
BREATHING: 0
NEGVOCALIZATION: 0
BREATHING: 0
CONSOLABILITY: 0
BODYLANGUAGE: 0
BREATHING: 0
TOTALSCORE: 0
TOTALSCORE: 0
BODYLANGUAGE: 0
NEGVOCALIZATION: 0
TOTALSCORE: 0
CONSOLABILITY: 0
BREATHING: 0
BODYLANGUAGE: 0
FACIALEXPRESSION: 0
BREATHING: 0
BODYLANGUAGE: 0
BREATHING: 0
NEGVOCALIZATION: 0
FACIALEXPRESSION: 0
BODYLANGUAGE: 0
TOTALSCORE: 0
CONSOLABILITY: 0
TOTALSCORE: 0
TOTALSCORE: 0
NEGVOCALIZATION: 0
BREATHING: 1
BREATHING: 0
CONSOLABILITY: 0
BREATHING: 0
CONSOLABILITY: 0
NEGVOCALIZATION: 0
FACIALEXPRESSION: 0
BODYLANGUAGE: 0
BREATHING: 0
TOTALSCORE: 0
BREATHING: 0
NEGVOCALIZATION: 0
FACIALEXPRESSION: 0
FACIALEXPRESSION: 0
TOTALSCORE: 0
BREATHING: 0
BODYLANGUAGE: 0
BREATHING: 0
BODYLANGUAGE: 0
BODYLANGUAGE: 0
NEGVOCALIZATION: 0
CONSOLABILITY: 0
BODYLANGUAGE: 0
NEGVOCALIZATION: 0
BREATHING: 1
CONSOLABILITY: 0
CONSOLABILITY: 0
BREATHING: 0
BODYLANGUAGE: 0
NEGVOCALIZATION: 0
BODYLANGUAGE: 0
BODYLANGUAGE: 0
TOTALSCORE: 0
BREATHING: 0
FACIALEXPRESSION: 0
CONSOLABILITY: 0
TOTALSCORE: 0
FACIALEXPRESSION: 0
CONSOLABILITY: 0
NEGVOCALIZATION: 0
CONSOLABILITY: 0
BREATHING: 0
CONSOLABILITY: 0
TOTALSCORE: 0
CONSOLABILITY: 0
FACIALEXPRESSION: 0
BREATHING: 0
BODYLANGUAGE: 0
NEGVOCALIZATION: 0
FACIALEXPRESSION: 0
FACIALEXPRESSION: 0
NEGVOCALIZATION: 0
BODYLANGUAGE: 0
TOTALSCORE: 0
BODYLANGUAGE: 0
NEGVOCALIZATION: 0
TOTALSCORE: 0
NEGVOCALIZATION: 0
CONSOLABILITY: 0
BREATHING: 0
BODYLANGUAGE: 0
BODYLANGUAGE: 0
NEGVOCALIZATION: 0
BODYLANGUAGE: 0
BREATHING: 0
BODYLANGUAGE: 0
FACIALEXPRESSION: 0
BODYLANGUAGE: 0
BODYLANGUAGE: 0
FACIALEXPRESSION: 0
BREATHING: 0
TOTALSCORE: 0
FACIALEXPRESSION: 0
TOTALSCORE: 0
TOTALSCORE: 0
BREATHING: 0
NEGVOCALIZATION: 0
CONSOLABILITY: 0
BREATHING: 0
FACIALEXPRESSION: 0
BREATHING: 0
FACIALEXPRESSION: 0
BODYLANGUAGE: 0
BREATHING: 0
TOTALSCORE: 0
BREATHING: 0
FACIALEXPRESSION: 0
BREATHING: 0
CONSOLABILITY: 0
BREATHING: 0
NEGVOCALIZATION: 0
CONSOLABILITY: 0
FACIALEXPRESSION: 0
BODYLANGUAGE: 0
FACIALEXPRESSION: 0
BREATHING: 0
FACIALEXPRESSION: 0
BODYLANGUAGE: 0
CONSOLABILITY: 0
BODYLANGUAGE: 0
NEGVOCALIZATION: 0
TOTALSCORE: 0
BREATHING: 0
FACIALEXPRESSION: 0
TOTALSCORE: 0
NEGVOCALIZATION: 0
FACIALEXPRESSION: 0
NEGVOCALIZATION: 0
NEGVOCALIZATION: 0
TOTALSCORE: 0
CONSOLABILITY: 0
TOTALSCORE: 0
FACIALEXPRESSION: 0
NEGVOCALIZATION: 0
CONSOLABILITY: 0
NEGVOCALIZATION: 0
BODYLANGUAGE: 0
NEGVOCALIZATION: 0
NEGVOCALIZATION: 0
BREATHING: 0
BREATHING: 0
NEGVOCALIZATION: 0
CONSOLABILITY: 0
NEGVOCALIZATION: 0
CONSOLABILITY: 0
CONSOLABILITY: 0
FACIALEXPRESSION: 0
FACIALEXPRESSION: 0
CONSOLABILITY: 0
TOTALSCORE: 0
NEGVOCALIZATION: 0
TOTALSCORE: 0
BODYLANGUAGE: 0
NEGVOCALIZATION: 0
FACIALEXPRESSION: 0
TOTALSCORE: 1
TOTALSCORE: 0
TOTALSCORE: 0
BODYLANGUAGE: 0
NEGVOCALIZATION: 0
CONSOLABILITY: 0
BREATHING: 0
BODYLANGUAGE: 0
FACIALEXPRESSION: 0
TOTALSCORE: 0
BODYLANGUAGE: 0
NEGVOCALIZATION: 0
NEGVOCALIZATION: 0
TOTALSCORE: 0
CONSOLABILITY: 0
BODYLANGUAGE: 0
NEGVOCALIZATION: 0
BREATHING: 0
CONSOLABILITY: 0
BODYLANGUAGE: 0
BREATHING: 0
CONSOLABILITY: 0
NEGVOCALIZATION: 0
NEGVOCALIZATION: 0
CONSOLABILITY: 0
FACIALEXPRESSION: 0
CONSOLABILITY: 0
TOTALSCORE: 0
BODYLANGUAGE: 0
CONSOLABILITY: 0
FACIALEXPRESSION: 0
BODYLANGUAGE: 0
BODYLANGUAGE: 0
CONSOLABILITY: 0
BREATHING: 0
FACIALEXPRESSION: 0
BODYLANGUAGE: 0
FACIALEXPRESSION: 0
TOTALSCORE: 0
NEGVOCALIZATION: 0
CONSOLABILITY: 0
TOTALSCORE: 0
CONSOLABILITY: 0
TOTALSCORE: 0
FACIALEXPRESSION: 0
BODYLANGUAGE: 0
BREATHING: 0
TOTALSCORE: 0
NEGVOCALIZATION: 0
BREATHING: 0
NEGVOCALIZATION: 0
CONSOLABILITY: 0
BREATHING: 0
TOTALSCORE: 0
BREATHING: 0
CONSOLABILITY: 0
FACIALEXPRESSION: 0
BODYLANGUAGE: 0
BREATHING: 0
NEGVOCALIZATION: 0
BODYLANGUAGE: 0
BODYLANGUAGE: 0
TOTALSCORE: 0
BODYLANGUAGE: 0
TOTALSCORE: 0
CONSOLABILITY: 0
BODYLANGUAGE: 0
TOTALSCORE: 0
FACIALEXPRESSION: 0
NEGVOCALIZATION: 0
CONSOLABILITY: 0
BREATHING: 0
CONSOLABILITY: 0
FACIALEXPRESSION: 0
BREATHING: 0
NEGVOCALIZATION: 0
TOTALSCORE: 0
CONSOLABILITY: 0
CONSOLABILITY: 0
BREATHING: 0
TOTALSCORE: 0
BODYLANGUAGE: 0
CONSOLABILITY: 0
BREATHING: 0
NEGVOCALIZATION: 0
FACIALEXPRESSION: 0
FACIALEXPRESSION: 0
CONSOLABILITY: 0
FACIALEXPRESSION: 0
BODYLANGUAGE: 0
BODYLANGUAGE: 0
NEGVOCALIZATION: 0
NEGVOCALIZATION: 0
BREATHING: 0
NEGVOCALIZATION: 0
BREATHING: 0
NEGVOCALIZATION: 0
TOTALSCORE: 0
NEGVOCALIZATION: 0
NEGVOCALIZATION: 0
TOTALSCORE: 0
FACIALEXPRESSION: 0
CONSOLABILITY: 0
FACIALEXPRESSION: 0
BODYLANGUAGE: 0
FACIALEXPRESSION: 0
BREATHING: 0
CONSOLABILITY: 0
TOTALSCORE: 0
NEGVOCALIZATION: 0
CONSOLABILITY: 0
CONSOLABILITY: 0
TOTALSCORE: 0
TOTALSCORE: 0
BODYLANGUAGE: 0
BODYLANGUAGE: 0
TOTALSCORE: 0
TOTALSCORE: 0
CONSOLABILITY: 0
BREATHING: 0

## 2018-04-01 PROBLEM — J69.0 ASPIRATION PNEUMONIA (HCC): Status: ACTIVE | Noted: 2018-01-01

## 2018-04-01 PROBLEM — J69.0 ACUTE ASPIRATION PNEUMONIA (HCC): Status: RESOLVED | Noted: 2017-01-01 | Resolved: 2018-01-01

## 2018-04-01 PROBLEM — J18.9 PNA (PNEUMONIA): Status: ACTIVE | Noted: 2018-01-01

## 2018-04-01 PROBLEM — I69.90 LATE EFFECTS OF CVA (CEREBROVASCULAR ACCIDENT): Status: ACTIVE | Noted: 2018-01-01

## 2018-04-10 PROBLEM — E11.65 TYPE II DIABETES MELLITUS WITH HYPEROSMOLARITY, UNCONTROLLED (HCC): Status: ACTIVE | Noted: 2018-01-01

## 2018-04-10 PROBLEM — L89.153 DECUBITUS ULCER OF SACRAL REGION, STAGE 3 (HCC): Status: ACTIVE | Noted: 2018-01-01

## 2018-04-10 PROBLEM — E11.00 TYPE II DIABETES MELLITUS WITH HYPEROSMOLARITY, UNCONTROLLED (HCC): Status: ACTIVE | Noted: 2018-01-01

## 2018-04-22 PROBLEM — J18.9 HCAP (HEALTHCARE-ASSOCIATED PNEUMONIA): Status: ACTIVE | Noted: 2018-01-01

## 2018-05-22 PROBLEM — N17.9 ACUTE RENAL FAILURE SUPERIMPOSED ON CHRONIC KIDNEY DISEASE (HCC): Status: ACTIVE | Noted: 2018-01-01

## 2018-05-22 PROBLEM — T83.511A UTI (URINARY TRACT INFECTION) DUE TO URINARY INDWELLING CATHETER (HCC): Status: ACTIVE | Noted: 2018-01-01

## 2018-05-22 PROBLEM — E87.5 HYPERKALEMIA: Status: ACTIVE | Noted: 2018-01-01

## 2018-05-22 PROBLEM — N18.9 ACUTE RENAL FAILURE SUPERIMPOSED ON CHRONIC KIDNEY DISEASE (HCC): Status: ACTIVE | Noted: 2018-01-01

## 2018-05-22 PROBLEM — E11.00 TYPE II DIABETES MELLITUS WITH HYPEROSMOLARITY, UNCONTROLLED (HCC): Status: RESOLVED | Noted: 2018-01-01 | Resolved: 2018-01-01

## 2018-05-22 PROBLEM — N39.0 UTI (URINARY TRACT INFECTION) DUE TO URINARY INDWELLING CATHETER (HCC): Status: ACTIVE | Noted: 2018-01-01

## 2018-05-22 PROBLEM — J69.0 ASPIRATION PNEUMONIA (HCC): Status: RESOLVED | Noted: 2018-01-01 | Resolved: 2018-01-01

## 2018-05-22 PROBLEM — A41.9 SEPSIS (HCC): Status: ACTIVE | Noted: 2018-01-01

## 2018-05-22 PROBLEM — J18.9 HCAP (HEALTHCARE-ASSOCIATED PNEUMONIA): Status: RESOLVED | Noted: 2018-01-01 | Resolved: 2018-01-01

## 2018-05-22 PROBLEM — E11.65 TYPE II DIABETES MELLITUS WITH HYPEROSMOLARITY, UNCONTROLLED (HCC): Status: RESOLVED | Noted: 2018-01-01 | Resolved: 2018-01-01

## 2018-05-26 PROBLEM — L89.153 DECUBITUS ULCER OF SACRAL REGION, STAGE 3 (HCC): Status: RESOLVED | Noted: 2018-01-01 | Resolved: 2018-01-01

## 2018-05-26 PROBLEM — N18.9 ACUTE RENAL FAILURE SUPERIMPOSED ON CHRONIC KIDNEY DISEASE (HCC): Status: RESOLVED | Noted: 2018-01-01 | Resolved: 2018-01-01

## 2018-05-26 PROBLEM — A41.9 SEPSIS (HCC): Status: RESOLVED | Noted: 2018-01-01 | Resolved: 2018-01-01

## 2018-05-26 PROBLEM — N17.9 ACUTE RENAL FAILURE SUPERIMPOSED ON CHRONIC KIDNEY DISEASE (HCC): Status: RESOLVED | Noted: 2018-01-01 | Resolved: 2018-01-01

## 2018-05-26 PROBLEM — N18.30 CHRONIC RENAL FAILURE IN PEDIATRIC PATIENT, STAGE 3 (MODERATE) (HCC): Status: ACTIVE | Noted: 2018-01-01

## 2018-05-26 PROBLEM — J18.9 PNEUMONIA: Status: ACTIVE | Noted: 2018-01-01

## 2018-05-29 PROBLEM — E87.70 FLUID OVERLOAD: Status: ACTIVE | Noted: 2018-01-01

## 2018-05-29 PROBLEM — J18.9 PNEUMONIA: Status: RESOLVED | Noted: 2018-01-01 | Resolved: 2018-01-01

## 2018-05-29 PROBLEM — N17.9 ACUTE KIDNEY INJURY SUPERIMPOSED ON CHRONIC KIDNEY DISEASE (HCC): Status: ACTIVE | Noted: 2018-01-01

## 2018-05-29 PROBLEM — D50.9 IRON DEFICIENCY ANEMIA: Status: ACTIVE | Noted: 2018-01-01

## 2018-05-29 PROBLEM — N18.9 ACUTE KIDNEY INJURY SUPERIMPOSED ON CHRONIC KIDNEY DISEASE (HCC): Status: ACTIVE | Noted: 2018-01-01

## 2018-05-29 PROBLEM — G81.91 RIGHT HEMIPLEGIA (HCC): Status: ACTIVE | Noted: 2018-01-01

## 2018-06-16 PROBLEM — Z71.89 DIABETES EDUCATION, ENCOUNTER FOR: Status: ACTIVE | Noted: 2018-01-01

## 2018-07-10 PROBLEM — N39.0 UTI (URINARY TRACT INFECTION) DUE TO URINARY INDWELLING CATHETER (HCC): Status: ACTIVE | Noted: 2018-01-01

## 2018-07-10 PROBLEM — T83.511A UTI (URINARY TRACT INFECTION) DUE TO URINARY INDWELLING CATHETER (HCC): Status: ACTIVE | Noted: 2018-01-01

## 2018-07-13 PROBLEM — R41.82 ALTERED MENTAL STATUS: Status: ACTIVE | Noted: 2018-01-01

## 2018-07-13 NOTE — ED PROVIDER NOTES
 Muscle weakness (generalized)     Need for assistance with personal care     Pneumonia          Procedure Laterality Date    CARDIAC SURGERY      angioplasty    COLOSTOMY      GASTROSTOMY TUBE PLACEMENT N/A 05/07/2018    LAPAROSCOPIC GASTROSTOMY TUBE INSERTION; INTRA OP EGD     His family history includes Other in his mother. He reports that he has quit smoking. He has never used smokeless tobacco. He reports that he does not drink alcohol or use drugs.     Medications     Previous Medications    ACETAMINOPHEN (TYLENOL) 650 MG SUPPOSITORY    Place 1 suppository rectally every 6 hours as needed for Fever or Pain    ALBUTEROL (PROVENTIL) (2.5 MG/3ML) 0.083% NEBULIZER SOLUTION    Take 2.5 mg by nebulization every 6 hours as needed for Wheezing    APIXABAN (ELIQUIS) 5 MG TABS TABLET    1 tablet by Per G Tube route 2 times daily    ATORVASTATIN (LIPITOR) 40 MG TABLET    40 mg by Per G Tube route nightly    COLLAGENASE 250 UNIT/GM OINTMENT    Apply topically daily Apply topically L hip    DIGOXIN (LANOXIN) 125 MCG TABLET    125 mcg by Per G Tube route daily    DILTIAZEM (CARDIZEM) 60 MG TABLET    1 tablet by Per G Tube route 4 times daily    FERROUS GLUCONATE (FERGON) 324 (38 FE) MG TABLET    324 mg by Per G Tube route 2 times daily    FUROSEMIDE (LASIX) 40 MG TABLET    40 mg by Per G Tube route daily    INSULIN GLARGINE (LANTUS SOLOSTAR) 100 UNIT/ML INJECTION    Inject 52 Units into the skin nightly    INSULIN LISPRO (HUMALOG) 100 UNIT/ML INJECTION VIAL    Inject 12 Units into the skin every 6 hours    INSULIN LISPRO (HUMALOG) 100 UNIT/ML INJECTION VIAL    Inject 0-6 Units into the skin every 6 hours    IPRATROPIUM-ALBUTEROL (DUONEB) 0.5-2.5 (3) MG/3ML SOLN NEBULIZER SOLUTION    Inhale 3 mLs into the lungs every 4 hours as needed for Shortness of Breath    METOPROLOL TARTRATE (LOPRESSOR) 100 MG TABLET    1 tablet by Per G Tube route 2 times daily    MOMETASONE-FORMOTEROL (DULERA) 100-5 MCG/ACT INHALER    Inhale 2 puffs into the lungs 2 times daily    OMEPRAZOLE (PRILOSEC) 20 MG DELAYED RELEASE CAPSULE    Take 20 mg by mouth daily Via G tube    POLYETHYLENE GLYCOL (GLYCOLAX) PACKET    17 g by Per G Tube route daily     SENNA-DOCUSATE (PERICOLACE) 8.6-50 MG PER TABLET    2 tablets by Per G Tube route 2 times daily     SILVER SULFADIAZINE (SILVADENE) 1 % CREAM    Apply topically 2 times daily Apply topically daily. Allergies     He is allergic to nsaids. Physical Exam     INITIAL VITALS: BP (!) 137/52   Pulse 88   Resp 20   Wt 265 lb (120.2 kg)   SpO2 100%   BMI 38.02 kg/m²      General:  Chronically ill-appearing male with multiple contractures     HEENT:  NC/AT, PERRL, OP clear, uvula/tongue midline, MMM    Neck:  Supple. Trachea midline. Pulmonary:  Good effort, no accessory muscle use. Coarse crackles in the lung bases    Cardiac:  RRR  no M/R/G    Abdomen:  S/NT/ND, no rebound, rigidity, or guarding. G-tube in place no erythema or fluctuance. Musculoskeletal:  No deformities noted, baseline ROM, no TTP    Extremities: W/WP no C/C/E    Vascular:  palpable and symmetric radial/DP pulses. Skin:  No rashes or lesions noted, non-icteri,c stage IV sacral decubitus ulcer with healthy granulation tissue    Neurological: strength grossly intact in upper and lower extremities. Sensation grossly intact to light touch in upper and lower extremities. Diagnostic Results     EKG   Atrial flutter with variable AV block at a rate of 88 with a left axis deviation, normal intervals, QTc 389, low voltage QRS complexes, no ST segment changes, no T wave inversion. Interpreted in conjunction with emergency department physician Ernie White MD      RADIOLOGY:  XR CHEST PORTABLE   Final Result      No acute disease. Stable compared to prior study            CT Head WO Contrast (CODE STROKE)   Final Result      Prior right cerebellar infarction with volume loss and   low-attenuation.  No acute hemorrhage or CALL  Frankie Martinez tel. [phone],  Chemistry results called to and read back by Margarita Lizama, 07/13/2018  11:05, by OBALI  Performed at: [facility]  [address], [place] [address]   Phone [phone]   LIPASE - Abnormal; Notable for the following:     Lipase 74.0 (*)     All other components within normal limits    Narrative:     Jin Bhatt tel. [phone],  Chemistry results called to and read back by Margarita Lizama, 07/13/2018  11:05, by OBALI  Performed at: [facility]  [address], [place] [address]   Phone [phone]   TROPONIN - Abnormal; Notable for the following:     Troponin 0.29 (*)     All other components within normal limits    Narrative:     Jin Bhatt tel. [phone],  Chemistry results called to and read back by Margarita Lizama, 07/13/2018  11:05, by OBALI  Performed at: [facility]  [address], [place] [address]   Phone [phone]   ACETAMINOPHEN LEVEL - Abnormal; Notable for the following:     Acetaminophen Level <5 (*)     All other components within normal limits    Narrative:     Jin Bhatt tel. [phone],  Chemistry results called to and read back by Margarita Lizama, 07/13/2018  11:05, by OBALI  Performed at: [facility]  [address], [place] [address]   Phone [phone]   SALICYLATE LEVEL - Abnormal; Notable for the following:     Salicylate, Serum <4.7 (*)     All other components within normal limits    Narrative:     Jin Bhatt tel. [phone],  Chemistry results called to and read back by Margarita Lizama, 07/13/2018  11:05, by OBALI  Performed at:   [facility]  [address], [address]   Phone [phone]   URINALYSIS - Abnormal; Notable for the following:     Blood, MIKE  Performed at: [facility] -   [address]   Phone [phone]   LACTIC ACID, PLASMA    Narrative:     Performed at: [facility] -   [address]   Phone [phone]   AMMONIA    Narrative:     Performed at: [facility] -   [address]   Phone [phone]   ETHANOL    Narrative:     Roger Lewis tel. [phone],  Chemistry results called to and read back by Sharlene Cruz, 07/13/2018  11:05, by MIKE  Performed at: [facility] -   [address]   Phone [phone]   URINE DRUG SCREEN    Narrative:     Performed at: [facility] -   [address]   Phone [phone]   DIGOXIN LEVEL    Narrative:     Performed at: [facility] -   [address]   Phone [phone]   BLOOD GAS, VENOUS   TSH WITHOUT REFLEX   T4, FREE   POCT VENOUS    Narrative:     Performed at: [facility] -   [address]   Phone [phone]       ED BEDSIDE ULTRASOUND:  None*This procedure was performed in the presence of the Emergency Medicine Attending.     RECENT VITALS:  BP: (!) 137/52,  , Pulse: 88, Resp: 20     Procedures     Left ultrasound guided deep brachial IV    ED Course     The patient was given the following medications:  Orders Placed This Encounter   Medications    iopamidol (ISOVUE-370) 76 % injection 80 mL    aspirin chewable tablet 324 mg       CONSULTS:  IP CONSULT TO HOSPITALIST    MEDICAL DECISION MAKING     Jed Meehan is a 80 y.o. male who presented to the emergency department with Altered Mental Status (Pt sent to ED from [place]

## 2018-07-13 NOTE — PROGRESS NOTES
4 Eyes Admission Assessment     I agree as the admission nurse that 2 RN's have performed a thorough Head to Toe Skin Assessment on the patient. ALL assessment sites listed below have been assessed on admission. Areas assessed by both nurses:   [x]   Head, Face, and Ears   [x]   Shoulders, Back, and Chest  [x]   Arms, Elbows, and Hands   [x]   Coccyx, Sacrum, and Ischum  [x]   Legs, Feet, and Heels        Does the Patient have Skin Breakdown?   Yes a wound was noted on the Admission Assessment and an LDA was Initiated documentation include the Pinky-wound, Wound Assessment, Measurements, Dressing Treatment, Drainage, and Color\",         Stephen Prevention initiated:  Yes   Wound Care Orders initiated:  Yes      [address]  nurse consulted for Pressure Injury (Stage 3,4, Unstageable, DTI, NWPT, and Complex wounds):  Yes      Nurse 1 eSignature: Electronically signed by Daniele Arthur RN on 7/13/18 at 7:19 PM    **SHARE this note so that the co-signing nurse is able to place an eSignature**    Nurse 2 eSignature: Electronically signed by Venus Fuentes RN on 7/13/18 at 7:19 PM

## 2018-07-13 NOTE — PROGRESS NOTES
Patient admitted to PCU accompanied by family and ED PCA. Patient placed on continuous tele, vitals obtained, assessment complete, and patient resting. Wife and daughter at bedside. Patient is non-verbal s/p CVA, bed bound, NPO w/ pre-existing G-tube, several pre-existing wounds (documented on flowsheet), and pre-existing wolff. Patient appears to be asleep.  Electronically signed by Eden Mueller RN on 7/13/2018 at 3:43 PM

## 2018-07-13 NOTE — PLAN OF CARE
Problem: Nutrition  Goal: Optimal nutrition therapy  Outcome: Ongoing  Nutrition Problem: Inadequate oral intake  Intervention: Food and/or Nutrient Delivery: Start Tube Feeding, Start ONS  Nutritional Goals: Pt will tolerate EN to meet 100% of needs with Protein modular to aid w/ wound healing

## 2018-07-13 NOTE — CONSULTS
Hepatic: Recent Labs      07/13/18   1028  07/13/18   1422  07/14/18   0442   AST  25  16  18   ALT  16  13  15   BILITOT  0.3  0.3  0.3   ALKPHOS  110  91  106     Troponin:   Recent Labs      07/13/18 2007 07/14/18   0813  07/14/18   1615   TROPONINI  0.29*  0.40*  0.31*     BNP: No results for input(s): BNP in the last 72 hours. Lipids: No results for input(s): CHOL, TRIG, HDL, LDLCALC, LABVLDL in the last 72 hours. ABGs: No results for input(s): PHART, PO2ART, BPH8JYU in the last 72 hours. INR:   Recent Labs      07/13/18   1029  07/14/18   0442   INR  1.73*  1.48*     UA:  Recent Labs      07/13/18   1150   COLORU  Yellow   CLARITYU  Clear   GLUCOSEU  Negative   BILIRUBINUR  Negative   KETUA  Negative   SPECGRAV  1.010   BLOODU  LARGE*   PHUR  7.0  7.0   PROTEINU  100*   UROBILINOGEN  0.2   NITRU  Negative   LEUKOCYTESUR  Negative   LABMICR  YES   URINETYPE  Not Specified      Urine Microscopic: Recent Labs      07/13/18   1150   WBCUA  0-2   RBCUA  20-50*     Urine Culture:   Recent Labs      07/13/18   2155   LABURIN  No growth at 18-36 hours     Urine Chemistry: Recent Labs      07/14/18   2045   LABCREA  33.0*   PROTEINUR  67.90*             IMAGING:  XR FOOT RIGHT (MIN 3 VIEWS)   Final Result      1. No subcutaneous gas or bone destruction identified. 2. Mild degenerative changes of first metatarsal-phalangeal joint. Interpreted by:   Purnima Valdez MD      Signed by:   Purnima Valdez MD   7/14/18      Final result      CT CHEST WO CONTRAST   Final Result   Patchy groundglass opacities in all lung fields. Primary differential consideration would be atypical pneumonia   versus interstitial pulmonary edema. CTA HEAD W CONTRAST   Final Result      1. No flow-limiting stenosis or occlusion identified with   limitations described above. 2. Groundglass hazy opacities in both lungs, upper lobe regions   and superior segment lower lobe regions.    3. Previous infarction and configuration for age. BRAIN PARENCHYMA: Gray-white matter differentiation is normal. No   intracranial mass effect. SKULL: No destructive osseous process or fracture. PARANASAL SINUSES / MASTOIDS: No acute sinusitis or mastoiditis. ORBITS: Normal.      EXTRACRANIAL SOFT TISSUES: Normal.      OTHER: Patchy airspace disease, groundglass airspace disease noted   in both upper lobes and superior segment lower lobes. IMPRESSION:      1. Suboptimal study due to early bolus timing. There appears to be   normal patency of both vertebral arteries with the left vertebral   dominant without dissection or occlusion     2. Approximately 50% stenosis of the right cavernous carotid   artery and 50-70% stenosis on the left without occlusion. Normal   distal branching   3. Prior infarct in the right cerebellum with encephalomalacia and   left thalamus with encephalomalacia as described on prior CTs   without acute hemorrhage      XR CHEST PORTABLE   Final Result      No acute disease. Stable compared to prior study            CT Head WO Contrast (CODE STROKE)   Final Result      Prior right cerebellar infarction with volume loss and   low-attenuation. No acute hemorrhage or hydrocephalus. Moderate cerebral atrophy with previous infarct in left thalamus   noted and encephalomalacia      Soft tissue mass measuring 3 x 2.4 cm in the right occipital and   suboccipital soft tissues with skin thickening, correlate   clinically for area of active mass or infection, see image 16 of   the axial sequences.  This is stable compared to prior study,   correlate clinically          Assessment/Plan        DELORIS on CKD 3  - sec to CRS   - agree with diuresis   - need to inc free water as Na worsening   - Monitor Lytes and renal function   - labs in am     Pressure Ulcer   - Podiatry following     DM   - SSI   - Monitor BS         Thank you for allowing us to participate in care of Imani Simmons

## 2018-07-13 NOTE — PAYOR INFORMATION
Patient Jaleesa Eis:  [de-identified]  Primary AUTH/CERT:    [id number] Name:   [address] [place] [address],[address]  Primary Insurance Plan Name:  MEDICARE INPT/OUTPT  Primary Insurance Group Number:    Primary Insurance Plan Type: [facility]  Primary Insurance Policy Number:  [id number]    Secondary AUTH/CERT:    [address] [facility] Name:   Marco A Booker  Secondary Insurance Plan Name:   M/CARE COMP  Secondary Insurance Group Number:    Secondary Insurance Plan Type: S  Secondary Insurance Policy Number:  [id number]

## 2018-07-13 NOTE — H&P
Internal Medicine PGY-1 Resident History & Physical      PCP: Ivy Arreola    Date of Admission: 7/13/2018    Date of Service: Pt seen/examined on 7/13/18 and Admitted to Inpatient with expected LOS greater than two midnights due to medical therapy. Chief Complaint:  Altered Mental Status       History Of Present Illness:      80 y.o. male [facility]. 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 Patient has been at [facility] since being d'sharan last month on 6/18 for sepsis and aspiration PNA and subsequently treated for volume overload and pulmonary edema. Patient was sent to [place] for rehab but according to spouse, has been mostly bed bound for the last week. They denied any recent changes in his baseline mentation since this AM and says since they have been in the ED, he has improved from a mentation standpoint. Was sent over this AM from [place] by RT because patient was found in his room in respiratory distress and unresponsive to verbal stimuli. Since being in , patient has been on 2 L NC and was briefly on non-rebreather this AM. Patient unable to provide any further history. In ED, EKG revealed atrial flutter with variable AV block with LAD and normal variables. Patient was weaned to nasal cannula. Stat CT head was stable compared to prior. CXR and CTA wnl but showed some ground glass hazy opacities (upper lobe and superior segments). BNP was elevated to 2718. Cr elevated to 1.6 (0.8-1.0). Lipase was 74 and troponin was 0.29. Patient had large amount of blood and protein on UA but no bacteria. LA, ammonia, UDS and digoxin levels were wnl and patient was HDS. VBG displayed possible metabolic alkalosis (possibly contraction alkalosis) POCT VBG 7.5/37.3/14.      Past Medical RBCUA 20-50 07/13/2018    BLOODU LARGE 07/13/2018    SPECGRAV 1.010 07/13/2018    GLUCOSEU Negative 07/13/2018       Radiology:       CTA HEAD W CONTRAST   Final Result      1. No flow-limiting stenosis or occlusion identified with   limitations described above. 2. Groundglass hazy opacities in both lungs, upper lobe regions   and superior segment lower lobe regions. 3. Previous infarction affecting the right cerebellum and left   thalamus with low-attenuation chronic ischemic changes of the   brain noted            PROCEDURE: CT ANGIOGRAPHY HEAD WITH/WITHOUT CONTRAST      INDICATION: possible stroke      COMPARISON: none      TECHNIQUE: Axial CT imaging obtained through the head prior to and   following administration of IV contrast. Axial images, multiplanar   reformatted images, and maximum intensity projection images were   reviewed for CT angiographic technique. IV contrast: 80 mL Omnipaque 350. FINDINGS:      ANTERIOR CIRCULATION: The intracranial internal carotid arteries   reveal some atherosclerotic plaquing in the cavernous carotid   areas with approximately 70 % stenosis on the left side and less   than 50% stenosis on the right side. There is normal patency of the anterior cerebral arteries, and   middle cerebral arteries demonstrate no occlusion or severe   stenosis. No evidence for aneurysm or arteriovenous malformation. POSTERIOR CIRCULATION: The bilateral vertebral arteries, basilar   artery and posterior cerebral arteries demonstrate no occlusion or   stenosis. No evidence for aneurysm or arteriovenous malformation. INTRACRANIAL VENOUS SYSTEM: No evidence for intracranial venous   thrombosis. INTRACRANIAL HEMORRHAGE: None. VENTRICLES: Normal in size and configuration for age. BRAIN PARENCHYMA: Gray-white matter differentiation is normal. No   intracranial mass effect. SKULL: No destructive osseous process or fracture.       PARANASAL SINUSES / MASTOIDS: compared to prior study,   correlate clinically          ASSESSMENT & PLAN:  Kristofer Castle is a 80 y.o. male w/ PMHx of CHF (EF 55% with LVH), prior CVA w/ R spastic hemiplegia and aphasia (but will respond with one word answers sometimes), PEG tube dependence HTN, Afib (on Eliquis and Digoxin), DM and CKD (stage 3) presented to AMS. Admitted for AMS. Suspicious for acute on chronic hypoxic respiratory failure as etiology. Active Hospital Problems    Diagnosis Date Noted    Altered mental status [R41.82] 07/13/2018     Acute Metabolic Encephalopathic  2/2 to acute on chronic hypoxic respiratory failure 2/2 CHF exacerbation vs. pneumonia  ECHO 4/18: mild concentric LVH, EF 50-55%, cannot rule out mid anteroseptal hypokinesis due to irregular heartbeat. Was admitted last month for sepsis and aspiration PNA and discharged to SNF for therapy. Patient has been on 2-3 L NC since being admitted to [facility] last month. Was not on any supplemental oxygen prior. CXR clear but CT showed some ground glass hazy opacities I both lungs and superior segment LL patchy airspace disease. BNP elevated to 2000s. -duonebs  -strict I&Os (has Walsh catheter)   -LFTs wnl  -LA wnl   -UA: large RBCs, without bacteria  -Ct chest wo contrast ordered  -D-dimer, CRP, ESR and procalcitonin pending   -BNP q 48 h for 2 occurrences starting tomorrow   -Cont diltiazem, metoprolol   -Lasix 40 mg IV BID     NSTEMI (Type 2) 2/2 to CHF exacerbation w/ compounding Afib (on Eliquis and Digoxin)   EKG in ED showed atrial flutter with variable AV block at rate 88bpm, LAD, normal intervals. No ST changes noted. Troponin was elevated to 0.29. UDS was neg.  May be secondary to CHF exacerbation with compounding Afib.   -cont ASA  -cont Eliquis and Digoxin   -trending trops q 6 h x2 (1500 and 2100)     Chronic CKD (stage 3)    Baseline creatinine 1.5.   -Lasix 40 mg IV BID   -strict I&Os  -nephrology consulted      Afib  EKG in ED showed atrial flutter with variable AV block at rate 88bpm, LAD, normal intervals. No ST changes noted. -cont Eliquis and Digoxin  -telemetry monitoring     Pressure Sores 2/2 to R spastic hemiplegia  Present on right medial calcaneus and on the right shoulder blade behind the axilla.   -Wound care and podiatry consulted    PEG tube   Placed in 7/2017 s/p CVA at [facility]. Has been receiving all meds via PEG. -wound ostomy eval  -Dietitian consulted: recommend continuous TF/NPO. Glucerna 1.2 (diabetic formula) @ 25 mL/hr and increase by 25mL/hr q 4 hrs until goal of 80 mL/hr x 24 hours. 125 mL H2O q4hrs, increase if Na increases > 145 mL/hr. 2 bottles Proteinix P2Go daily via syringe. Flush with 30 mL H20 before and after. Proteinex P2Go should not be mixed directly with the tube feeding formula.      DM Type 2  -cont Lantus 40 U nightly  -MDSSI    DVT Prophylaxis: Eliquis  GI Ppx: Protonix  Diet:   Continuous TF/ NPO  Code Status: Prior    PT/OT Eval Status: pending     Dispo - GIM hendrix       George Chaidez MD    I will discuss the patient with the senior resident and MD George Aguiar MD  Internal Medicine Resident, PGY-1  Pager: [phone]

## 2018-07-14 NOTE — CONSULTS
219  184     Recent Labs      07/13/18   1028  07/14/18   0442   NA  145  147*   K  5.1  4.2   CL  100  102   CO2  31  33*   PHOS  3.4   --    BUN  81*  78*   CREATININE  1.6*  1.5*     Recent Labs      07/13/18   1029  07/13/18   1422  07/14/18   0442   PROT   --   8.1  9.0*   INR  1.73*   --   1.48*     VASCULAR: DP and PT pulses are palpable 1/4 b/l. CFT is brisk to the digits of the foot b/l. Skin temperature is warm to cool from proximal to distal with no focal calor noted. Mild nonpitting edema noted to the b/l lower extremity. No pain with calf compression b/l. NEUROLOGIC: Unable to assess. DERMATOLOGIC: Necrotic eschar noted to the plantarmedial aspect of the right heel, measuring approximately 5x4.5cm. No active drainage, erythema, probing, or tracking is noted. Healed eschar noted to the lateral aspect of the right calf with associated soft tissue hypertrophy and step-off. No clinical signs of infection noted to the RLE. No other open areas noted. Nails 1-5 b/l are within normal limits of length, thickness, and color. Webspaces 1-4 b/l are clean, dry, and intact. No subcutaneous nodules, rashes, or other skin lesions noted. MUSCULOSKELETAL: Unable to assess. No obvious pain with palpation of the heel wound. IMPRESSION/RECOMMENDATIONS:    1. Pressure ulceration, plantar medial right heel - unstageable  2. Healed pressure ulceration, lateral right leg  3. Diabetes mellitus with suspected peripheral neuropathy  4. Encephalopathy    - Patient was seen and examined at bedside this am  - afebrile, tachycardic  - no leukocytosis, ESR pending  - Heel wound painted with betadine and left leg wound covered with mepilex border  - x-ray ordered of the right foot, will follow up results  - prevalon boots in place. Encourage use at all times while in bed  - no signs of infection.  No indication for antibiotics from a lower extremity standpoint.  - Will continue to see the patient daily for local wound care while he is in-house    DISPO: stable heel wound, okay for discharge from a podiatry standpoint. Patient was seen at bedside with BRAN Cruz, PGY-3  Pager: [phone]    Patient was seen and evaluated at bedside. Agree with residents assessment and treatment plan.   Anastasiya Lopez DPM

## 2018-07-14 NOTE — PROGRESS NOTES
Physical Therapy  HELD    Orders received. Chart Reviewed. Per RN pt is inappropriate for PT this AM per high troponins. Will attempt again later today vs 7/15 as schedule permits.      Joseph Macario, PT, DPT [id number]

## 2018-07-14 NOTE — PROGRESS NOTES
= 0    Sputum   ,  ,    Cough: Strong, spontaneous, non-productive = 0    Vital Signs   /73   Pulse 110   Temp 97.9 °F (36.6 °C) (Axillary)   Resp 22   Ht 6' (1.829 m)   Wt 265 lb (120.2 kg)   SpO2 99%   BMI 35.94 kg/m²   SPO2 (COPD values may differ): 90-91% on room air or greater than 92% on FiO2 24- 28% = 1    Peak Flow (asthma only): not applicable = 0    RSI: 7-8 = BID and Q4HPRN (every four hours as needed) for dyspnea        Plan       Goals: medication delivery and improve oxygenation    Patient/caregiver was educated on the proper method of use for Respiratory Care Devices:  Yes      Level of patient/caregiver understanding able to:   [] Verbalize understanding   [] Demonstrate understanding       [] Teach back        [x] Needs reinforcement       []  No available caregiver               []  Other:pt has asphasia post cerebra infarction   Response to education:  pt has asphasia post cerebral infarction     Is patient being placed on Home Treatment Regimen? Yes     Does the patient have everything they need prior to discharge? Yes     Comments: admits with altered mental status, asphasia post cerebral infarction    Plan of Care: hhn duoneb prn, mdi dulera prn ( pt unable to effectively perform )     Electronically signed by Amber Lyman RCP on 7/13/2018 at 8:35 PM    Respiratory Protocol Guidelines     1. Assessment and treatment by Respiratory Therapy will be initiated for medication and therapeutic interventions upon initiation of aerosolized medication. 2. Physician will be contacted for respiratory rate (RR) greater than 35 breaths per minute. Therapy will be held for heart rate (HR) greater than 140 beats per minute, pending direction from physician. 3. Bronchodilators will be administered via Metered Dose Inhaler (MDI) with spacer when the following criteria are met:  a.  Alert and cooperative     b. HR < 140 bpm  c. RR < 30 bpm                d. Can demonstrate a 23 second inspiratory hold  4. Bronchodilators will be administered via Hand Held Nebulizer ED[facility]) to patients when ANY of the following criteria are met  a. Incognizant or uncooperative          b. Patients treated with HHN at Home        c. Unable to demonstrate proper use of MDI with spacer     d. RR > 30 bpm   5. Bronchodilators will be delivered via Metered Dose Inhaler (MDI), HHN, Aerogen to intubated patients on mechanical ventilation. 6. Inhalation medication orders will be delivered and/or substituted as outlined below. Aerosolized Medications Ordering and Administration Guidelines:    1. All Medications will be ordered by a physician, and their frequency and/or modality will be adjusted as defined by the patients Respiratory Severity Index (RSI) score. 2. If the patient does not have documented COPD, consider discontinuing anticholinergics when RSI is less than 9.  3. If the bronchospasm worsens (increased RSI), then the bronchodilator frequency can be increased to a maximum of every 4 hours. If greater than every 4 hours is required, the physician will be contacted. 4. If the bronchospasm improves, the frequency of the bronchodilator can be decreased, based on the patient's RSI, but not less than home treatment regimen frequency. 5. Bronchodilator(s) will be discontinued if patient has a RSI less than 9 and has received no scheduled or as needed treatment for 72  Hrs. Patients Ordered on a Mucolytic Agent:    1. Must always be administered with a bronchodilator. 2. Discontinue if patient experiences worsened bronchospasm, or secretions have lessened to the point that the patient is able to clear them with a cough. Anti-inflammatory and Combination Medications:    1. If the patient lacks prior history of lung disease, is not using inhaled anti-inflammatory medication at home, and lacks wheezing by examination or by history for at least 24 hours, contact physician for possible discontinuation.

## 2018-07-14 NOTE — PROGRESS NOTES
ipratropium-albuterol, sodium chloride flush, magnesium hydroxide, ondansetron, glucose, dextrose, glucagon (rDNA), dextrose, mometasone-formoterol      Intake/Output Summary (Last 24 hours) at 07/14/18 1427  Last data filed at 07/14/18 1414   Gross per 24 hour   Intake             1523 ml   Output             3000 ml   Net            -1477 ml       Physical Exam Performed:    /68   Pulse 99   Temp 97.9 °F (36.6 °C) (Axillary)   Resp 18   Ht 6' (1.829 m)   Wt 252 lb 13.9 oz (114.7 kg)   SpO2 99%   BMI 34.29 kg/m²     General appearance:  morbidly obese  M, stimulated by voice but non-verbal  HEENT:  Normal cephalic, atraumatic without obvious deformity. Pupils equal, round, and reactive to light. Neck: Supple, with full range of motion. Trachea midline. Respiratory:  Normal respiratory effort. Mild crackles in lung bases bilaterally. Cardiovascular:  Tachycardic with irregular rhythm with normal S1/S2 without murmurs, rubs or gallops. Abdomen: Soft, non-tender, non-distended with normal bowel sounds. Peg tube in place  Musculoskeletal:  No clubbing, cyanosis and mild bilateral slightly pitting edema in hips. Multiple contractures. Skin: Skin color, texture, turgor normal. Sacral decubitus ulcer, healing over. 4x4 cm circular pressure sore on medial right calcaneus. Pressure sore on right scapula near armpit. Neurologic:  sensation grossly intact bilaterally with right sided spastic hemiplegia.     Psychiatric:  LIMITED, patient nonverbal   Peripheral Pulses: +2 palpable, equal bilaterally       Labs:   Recent Labs      07/13/18   1029  07/14/18   0442   WBC  9.4  7.9   HGB  8.8*  8.4*   HCT  27.4*  25.5*   PLT  219  184     Recent Labs      07/13/18   1028  07/14/18   0442   NA  145  147*   K  5.1  4.2   CL  100  102   CO2  31  33*   BUN  81*  78*   CREATININE  1.6*  1.5*   CALCIUM  9.9  9.9   PHOS  3.4   --      Recent Labs      07/13/18   1028  07/13/18   1422  07/14/18   0442 AST  25  16  18   ALT  16  13  15   BILIDIR  <0.2  <0.2   --    BILITOT  0.3  0.3  0.3   ALKPHOS  110  91  106     Recent Labs      07/13/18   1029  07/14/18   0442   INR  1.73*  1.48*     Recent Labs      07/13/18   1028  07/13/18   1422  07/13/18 2007 07/14/18   0813   CKTOTAL  110   --    --    --    TROPONINI  0.29*  0.29*  0.29*  0.40*       Urinalysis:    Lab Results   Component Value Date    NITRU Negative 07/13/2018    WBCUA 0-2 07/13/2018    BACTERIA 1+ 05/27/2018    RBCUA 20-50 07/13/2018    BLOODU LARGE 07/13/2018    SPECGRAV 1.010 07/13/2018    GLUCOSEU Negative 07/13/2018       Radiology:  XR FOOT RIGHT (MIN 3 VIEWS)   Final Result      1. No subcutaneous gas or bone destruction identified. 2. Mild degenerative changes of first metatarsal-phalangeal joint. Interpreted by:   Aaliyah Patel MD      Signed by:   Aaliyah Patel MD   7/14/18      Final result      CT CHEST WO CONTRAST   Final Result   Patchy groundglass opacities in all lung fields. Primary differential consideration would be atypical pneumonia   versus interstitial pulmonary edema. CTA HEAD W CONTRAST   Final Result      1. No flow-limiting stenosis or occlusion identified with   limitations described above. 2. Groundglass hazy opacities in both lungs, upper lobe regions   and superior segment lower lobe regions. 3. Previous infarction affecting the right cerebellum and left   thalamus with low-attenuation chronic ischemic changes of the   brain noted            PROCEDURE: CT ANGIOGRAPHY HEAD WITH/WITHOUT CONTRAST      INDICATION: possible stroke      COMPARISON: none      TECHNIQUE: Axial CT imaging obtained through the head prior to and   following administration of IV contrast. Axial images, multiplanar   reformatted images, and maximum intensity projection images were   reviewed for CT angiographic technique. IV contrast: 80 mL Omnipaque 350.       FINDINGS:      ANTERIOR CIRCULATION: The prior. CXR clear but CT showed some ground glass hazy opacities I both lungs and superior segment LL patchy airspace disease. BNP elevated to 2000s. Given overall picture, does not appear patient has an infectious etiology currently. He has remained afebrile with no leukocytosis. Increase likelihood that his acute on chronic respiratory failure is 2/2 CHF exacerbation.   -duonebs  -strict I&Os (has Walsh catheter)   -LFTs wnl  -LA wnl   -UA: large RBCs, without bacteria  -Ct chest wo contrast ordered  -D-dimer 668  - 23.4  -sed rate >120  -procalcitonin: 0.71  -BNP 3,035  -Cont diltiazem, metoprolol   -Lasix 40 mg IV BID   -repeat echo results pending     NSTEMI (Type 2) 2/2 to CHF exacerbation w/ compounding Afib (on Eliquis and Digoxin)   EKG in ED showed atrial flutter with variable AV block at rate 88bpm, LAD, normal intervals. No ST changes noted. Troponin was elevated to 0.29. UDS was neg. May be secondary to CHF exacerbation with compounding Afib.   -cont ASA  -cont Eliquis and Digoxin   -troponin on admission was 0.29, increased to 0.40. EKG today is similar to previous EKG with no ST changes noted. Will trend another troponin. Likely 2/2 patient's CHF exacerbation     Chronic CKD (stage 3)    Baseline creatinine 1.5.   -Lasix 40 mg IV BID   -strict I&Os  -nephrology consulted       Afib  EKG in ED showed atrial flutter with variable AV block at rate 88bpm, LAD, normal intervals. No ST changes noted. -cont Eliquis and Digoxin  -telemetry monitoring      Pressure Sores 2/2 to R spastic hemiplegia  Present on right medial calcaneus and on the right shoulder blade behind the axilla.   -Wound care and podiatry consulted     PEG tube   Placed in 7/2017 s/p CVA at [facility]. Has been receiving all meds via PEG. -wound ostomy eval  -Dietitian consulted: recommend continuous TF/NPO. Glucerna 1.2 (diabetic formula) @ 25 mL/hr and increase by 25mL/hr q 4 hrs until goal of 80 mL/hr x 24 hours.  125 mL H2O q4hrs,

## 2018-07-14 NOTE — PROGRESS NOTES
intracranial venous   thrombosis. INTRACRANIAL HEMORRHAGE: None. VENTRICLES: Normal in size and configuration for age. BRAIN PARENCHYMA: Gray-white matter differentiation is normal. No   intracranial mass effect. SKULL: No destructive osseous process or fracture. PARANASAL SINUSES / MASTOIDS: No acute sinusitis or mastoiditis. ORBITS: Normal.      EXTRACRANIAL SOFT TISSUES: Normal.      OTHER: Patchy airspace disease, groundglass airspace disease noted   in both upper lobes and superior segment lower lobes. IMPRESSION:      1. Suboptimal study due to early bolus timing. There appears to be   normal patency of both vertebral arteries with the left vertebral   dominant without dissection or occlusion     2. Approximately 50% stenosis of the right cavernous carotid   artery and 50-70% stenosis on the left without occlusion. Normal   distal branching   3. Prior infarct in the right cerebellum with encephalomalacia and   left thalamus with encephalomalacia as described on prior CTs   without acute hemorrhage      XR CHEST PORTABLE   Final Result      No acute disease. Stable compared to prior study            CT Head WO Contrast (CODE STROKE)   Final Result      Prior right cerebellar infarction with volume loss and   low-attenuation. No acute hemorrhage or hydrocephalus. Moderate cerebral atrophy with previous infarct in left thalamus   noted and encephalomalacia      Soft tissue mass measuring 3 x 2.4 cm in the right occipital and   suboccipital soft tissues with skin thickening, correlate   clinically for area of active mass or infection, see image 16 of   the axial sequences.  This is stable compared to prior study,   correlate clinically          Assessment/Plan     DELORIS on CKD 3  - sec to CRS   - agree with diuresis   - need to inc free water as Na worsening   - Monitor Lytes and renal function   - labs in am           Gallo Santiago MD  [facility]

## 2018-07-14 NOTE — PLAN OF CARE
Problem: Falls - Risk of:  Goal: Will remain free from falls  Will remain free from falls   Outcome: Ongoing  Pt resting in bed, no attempts to get out of bed. Will update as needed. Problem: Risk for Impaired Skin Integrity  Goal: Tissue integrity - skin and mucous membranes  Structural intactness and normal physiological function of skin and  mucous membranes. Outcome: Ongoing  On specialty mattress. Turning as needed. Checking ostomy as needed, emptying as needed. Will update as needed. Problem: Nutrition  Goal: Optimal nutrition therapy  Outcome: Ongoing  Tube feed started, increasing by 25 ml/hr incriments to goal of 80 ml/hr. Will update as needed.

## 2018-07-14 NOTE — PLAN OF CARE
Problem: Risk for Impaired Skin Integrity  Goal: Tissue integrity - skin and mucous membranes  Structural intactness and normal physiological function of skin and  mucous membranes. Intervention: SKIN ASSESSMENT  Skin was assessed and all wounds were documented under wound flowsheets. Wound care Rn consulted. Intervention: PRESSURE ULCER PREVENTION  Patient on special air mattress. Intervention: TURN PATIENT  Patient being turned q2 hours.

## 2018-07-15 NOTE — PROGRESS NOTES
Podiatric Surgery Daily Progress Note  [facility]  Subjective :   Patient seen and examined this am at the bedside. Patient appears more alert today and is able to nod to questions. Patient denies any other complaints. Does not relate to pain in the feet. Review of Systems: A 12 point review of symptoms is unremarkable with the exception of the chief complaint. Patient specifically denies nausea, fever, vomiting, chills, shortness of breath, chest pain, abdominal pain, constipation or difficulty urinating. Objective     BP (!) 161/74   Pulse 99   Temp 98.2 °F (36.8 °C) (Axillary)   Resp 18   Ht 6' (1.829 m)   Wt 256 lb 9.9 oz (116.4 kg)   SpO2 99%   BMI 34.80 kg/m²      I/O:  Intake/Output Summary (Last 24 hours) at 07/15/18 0907  Last data filed at 07/15/18 0600   Gross per 24 hour   Intake             2698 ml   Output             2582 ml   Net              116 ml              Wt Readings from Last 3 Encounters:   07/15/18 256 lb 9.9 oz (116.4 kg)   06/12/18 260 lb 11.2 oz (118.3 kg)   05/25/18 257 lb 6.4 oz (116.8 kg)       LABS:    Recent Labs      07/13/18   1029  07/14/18   0442   WBC  9.4  7.9   HGB  8.8*  8.4*   HCT  27.4*  25.5*   PLT  219  184      Recent Labs      07/13/18   1028  07/14/18   0442   NA  145  147*   K  5.1  4.2   CL  100  102   CO2  31  33*   PHOS  3.4   --    BUN  81*  78*   CREATININE  1.6*  1.5*        Recent Labs      07/13/18   1029  07/13/18   1422  07/14/18   0442   PROT   --   8.1  9.0*   INR  1.73*   --   1.48*           LOWER EXTREMITY EXAMINATION  VASCULAR: DP and PT pulses are palpable 1/4 b/l. CFT is brisk to the digits of the foot b/l. Skin temperature is warm to cool from proximal to distal with no focal calor noted. Mild nonpitting edema noted to the b/l lower extremity. No pain with calf compression b/l.     NEUROLOGIC: Unable to assess.     DERMATOLOGIC: Necrotic eschar noted to the plantarmedial aspect of the right heel, measuring approximately 5x4.5cm.  No

## 2018-07-15 NOTE — PROGRESS NOTES
verbal.  Pain Screening  Patient Currently in Pain:  (no outward signs of pain observed)  Vital Signs  Patient Currently in Pain: Unable to Assess       Orientation  Orientation  Overall Orientation Status:  (unable to assess)    Social/Functional History  Social/Functional History  Type of Home: Facility ([facility] Simultaneous filing. User may not have seen previous data.)  IADL Comments: Pt dependent for all ADL, Román Lift to chair (per facility staff)  Objective          AROM RLE (degrees)  RLE General AROM: no active movement noted   AROM LLE (degrees)  LLE General AROM: no active movement noted  Strength RLE  Comment: no active movement noted  Strength LLE  Comment: no active movement noted        Bed mobility  Rolling to Left: Dependent/Total  Rolling to Right: Dependent/Total        Transfers - bed to chair - dependent with maxi move      Education  Patient educated regarding  functional mobility,  No evidence of learning noted        Treatment rendered and includes exercise . Contact [facility] several times, able to reach nursing who clarified mobility level for this pt.       Activity tolerance - limited by cognitive status    Assessment   Assessment: Pt appears to be at baseline in terms of functional mobility,  Is a Román lift at [facility] and dependent for ADL  Treatment Diagnosis: Decreased functional mobility 2/2 AMS  Prognosis: Poor  Decision Making: Medium Complexity  Patient Education: role of PT, no response noted  Barriers to Learning: cognition/ams  No Skilled PT: No PT goals identified  REQUIRES PT FOLLOW UP: No         Plan   Plan  Times per week: Discharge from PT  Safety Devices  Type of devices: Call light within reach, Bed alarm in place, Nurse notified, Left in bed       G code  CN    Therapy Time   Individual Concurrent Group Co-treatment   Time In 0735         Time Out 0800         Minutes 25              Timed Code Treatment Minutes:   15    Total Treatment Minutes:  231 [facility]

## 2018-07-15 NOTE — PROGRESS NOTES
Resident Progress Note  PGY 3    Admit Date: 2018    PCP: Carlos Saravia                  : 1950  MRN: [id number]    Subjective: Interval History:     No acute events overnight. Patient seen at bedside this morning. The patient denies fevers, chills, chest pain, shortness of breath, nausea, vomiting, diarrhea, or constipation. Diet: DIET TUBE FEED CONTINUOUS/CYCLIC NPO; Diabetic (Glucerna 1.2); Gastrostomy; Continuous; 25; 80; 24    Data:     Scheduled Meds:   apixaban  5 mg Per G Tube BID    atorvastatin  40 mg Per G Tube Nightly    digoxin  125 mcg Per G Tube Daily    diltiazem  60 mg Per G Tube 4x Daily    metoprolol  100 mg Per G Tube BID    polyethylene glycol  17 g Per G Tube Daily    sennosides-docusate sodium  2 tablet Per G Tube BID    sodium chloride flush  10 mL Intravenous 2 times per day    insulin glargine  40 Units Subcutaneous Nightly    furosemide  40 mg Intravenous BID    pantoprazole  40 mg Intravenous Daily    insulin lispro  0-12 Units Subcutaneous 4 times per day    ferrous gluconate  324 mg Per G Tube BID     Continuous Infusions:   dextrose       PRN Meds:acetaminophen, albuterol, ipratropium-albuterol, sodium chloride flush, magnesium hydroxide, ondansetron, glucose, dextrose, glucagon (rDNA), dextrose, mometasone-formoterol  I/O last 3 completed shifts: In: 2698 [NG/GT:2698]  Out: 2044 [Urine:2182; Stool:400]  No intake/output data recorded.     Intake/Output Summary (Last 24 hours) at 07/15/18 0721  Last data filed at 07/15/18 0600   Gross per 24 hour   Intake             2698 ml   Output             2582 ml   Net              116 ml       Objective:     Vitals: BP (!) 150/83   Pulse 85   Temp 98.1 °F (36.7 °C) (Oral)   Resp 18   Ht 6' (1.829 m)   Wt 256 lb 9.9 oz (116.4 kg)   SpO2 98%   BMI 34.80 kg/m²     Physical Exam  General appearance:  morbidly obese  M, stimulated by voice but non-verbal  HEENT:  Normal cephalic, atraumatic prior study            CT Head WO Contrast (CODE STROKE)   Final Result      Prior right cerebellar infarction with volume loss and   low-attenuation. No acute hemorrhage or hydrocephalus. Moderate cerebral atrophy with previous infarct in left thalamus   noted and encephalomalacia      Soft tissue mass measuring 3 x 2.4 cm in the right occipital and   suboccipital soft tissues with skin thickening, correlate   clinically for area of active mass or infection, see image 16 of   the axial sequences. This is stable compared to prior study,   correlate clinically          Assessment/Plan:     HFrEF - LVEF 40%. Will require workup for now reduced LVEF, last ECHO was in 4/2018 with preserved EF at that point in time   - negative ~1L overnight  - lasix 40mg IV BID  - metoprolol 100mg BID  - strict I&Os    Acute on chronic hypoxic respiratory failure - patient wears bipap at home. On baseline oxygen requirement after being admitted to [facility] last month  - will order BIPAP once we get settings from wife  - could be secondary to HFrEF, will manage as above    AF - rate controlled  - telemetry monitoring  - metoprolol 100mg BID  - digoxin 125mcg daily  - diltiazem 60mg 4x/d  - eliquis     DELORIS on CKD III  - nephrology consulted   - diuresis as above     NSTEMI - demand ischemia. Troponin now trending down. Pressure sores  - wound care consulted  - podiatry consulted     DMII  - insulin glargine 40U qHS  - SSI  - accuchecks qAC and qHS  - hypoglycemia protocol       Code Status: Full Code   FEN: DIET TUBE FEED CONTINUOUS/CYCLIC NPO; Diabetic (Glucerna 1.2);  Gastrostomy; Continuous; 25; 80; 24   PPx: eliquis  Dispo: Floor    Patient will be discussed with attending, Liliana Nash MD.    Georgina Wright, PGY 3  Pager: [phone]  7/15/2018  7:21 AM

## 2018-07-16 PROBLEM — I42.0 DILATED CARDIOMYOPATHY (HCC): Status: ACTIVE | Noted: 2018-01-01

## 2018-07-16 NOTE — PROGRESS NOTES
Renal Progress Note  Subjective:   Admit Date: 7/13/2018     HPI      Interval History:   Good UO   Cr stable   Na elevated       DIET DIET TUBE FEED CONTINUOUS/CYCLIC NPO; Diabetic (Glucerna 1.2); Gastrostomy; Continuous; 25; 80; 24  Medications:   Scheduled Meds:   insulin lispro  0-18 Units Subcutaneous Q4H    furosemide  60 mg Intravenous BID    apixaban  5 mg Per G Tube BID    atorvastatin  40 mg Per G Tube Nightly    digoxin  125 mcg Per G Tube Daily    diltiazem  60 mg Per G Tube 4x Daily    metoprolol  100 mg Per G Tube BID    polyethylene glycol  17 g Per G Tube Daily    sennosides-docusate sodium  2 tablet Per G Tube BID    sodium chloride flush  10 mL Intravenous 2 times per day    insulin glargine  40 Units Subcutaneous Nightly    pantoprazole  40 mg Intravenous Daily    ferrous gluconate  324 mg Per G Tube BID     Continuous Infusions:   dextrose         Labs:  CBC:   Recent Labs      07/13/18 1029 07/14/18   0442   WBC  9.4  7.9   HGB  8.8*  8.4*   PLT  219  184     BMP:    Recent Labs      07/13/18   1028  07/14/18   0442   NA  145  147*   K  5.1  4.2   CL  100  102   CO2  31  33*   BUN  81*  78*   CREATININE  1.6*  1.5*   GLUCOSE  121*  78     Ca/Mg/Phos:   Recent Labs      07/13/18   1028  07/14/18   0442   CALCIUM  9.9  9.9   MG  2.30  2.30   PHOS  3.4   --      Hepatic:   Recent Labs      07/13/18   1028  07/13/18   1422  07/14/18   0442   AST  25  16  18   ALT  16  13  15   BILITOT  0.3  0.3  0.3   ALKPHOS  110  91  106     Troponin:   Recent Labs      07/13/18 2007 07/14/18   0813  07/14/18   1615   TROPONINI  0.29*  0.40*  0.31*     BNP: No results for input(s): BNP in the last 72 hours. Lipids: No results for input(s): CHOL, TRIG, HDL, LDLCALC, LABVLDL in the last 72 hours. ABGs: No results for input(s): PHART, PO2ART, KYI4WJG in the last 72 hours.   INR:   Recent Labs      07/13/18   1029 07/14/18   0442   INR  1.73*  1.48*     UA:  Recent Labs      07/13/18   1150 COLORU  Yellow   CLARITYU  Clear   GLUCOSEU  Negative   BILIRUBINUR  Negative   KETUA  Negative   SPECGRAV  1.010   BLOODU  LARGE*   PHUR  7.0  7.0   PROTEINU  100*   UROBILINOGEN  0.2   NITRU  Negative   LEUKOCYTESUR  Negative   LABMICR  YES   URINETYPE  Not Specified      Urine Microscopic:   Recent Labs      07/13/18   1150   WBCUA  0-2   RBCUA  20-50*     Urine Culture:   Recent Labs      07/13/18   2155   LABURIN  No growth at 18-36 hours     Urine Chemistry:   Recent Labs      07/14/18   2045   LABCREA  33.0*   PROTEINUR  67.90*       Objective:   Vitals: /66   Pulse 63   Temp 98.3 °F (36.8 °C) (Axillary)   Resp 20   Ht 6' (1.829 m)   Wt 256 lb 9.9 oz (116.4 kg)   SpO2 98%   BMI 34.80 kg/m²    Wt Readings from Last 3 Encounters:   07/15/18 256 lb 9.9 oz (116.4 kg)   06/12/18 260 lb 11.2 oz (118.3 kg)   05/25/18 257 lb 6.4 oz (116.8 kg)      24HR INTAKE/OUTPUT:      Intake/Output Summary (Last 24 hours) at 07/16/18 0058  Last data filed at 07/15/18 2349   Gross per 24 hour   Intake             3240 ml   Output             2850 ml   Net              390 ml     Constitutional:  Alert   NECK: supple, no JVD  Cardiovascular:  S1, S2 without m/r/g  Respiratory:  CTA B without w/r/r  Abdomen: +bs, soft, nt  Ext: no LE edema    Assessment and Plan:       IMAGING:  XR FOOT RIGHT (MIN 3 VIEWS)   Final Result      1. No subcutaneous gas or bone destruction identified. 2. Mild degenerative changes of first metatarsal-phalangeal joint. Interpreted by:   Edy Dutta MD      Signed by:   Edy Dutta MD   7/14/18      Final result      CT CHEST WO CONTRAST   Final Result   Patchy groundglass opacities in all lung fields. Primary differential consideration would be atypical pneumonia   versus interstitial pulmonary edema. CTA HEAD W CONTRAST   Final Result      1. No flow-limiting stenosis or occlusion identified with   limitations described above.      2. Groundglass hazy opacities in both lungs, upper lobe regions   and superior segment lower lobe regions. 3. Previous infarction affecting the right cerebellum and left   thalamus with low-attenuation chronic ischemic changes of the   brain noted            PROCEDURE: CT ANGIOGRAPHY HEAD WITH/WITHOUT CONTRAST      INDICATION: possible stroke      COMPARISON: none      TECHNIQUE: Axial CT imaging obtained through the head prior to and   following administration of IV contrast. Axial images, multiplanar   reformatted images, and maximum intensity projection images were   reviewed for CT angiographic technique. IV contrast: 80 mL Omnipaque 350. FINDINGS:      ANTERIOR CIRCULATION: The intracranial internal carotid arteries   reveal some atherosclerotic plaquing in the cavernous carotid   areas with approximately 70 % stenosis on the left side and less   than 50% stenosis on the right side. There is normal patency of the anterior cerebral arteries, and   middle cerebral arteries demonstrate no occlusion or severe   stenosis. No evidence for aneurysm or arteriovenous malformation. POSTERIOR CIRCULATION: The bilateral vertebral arteries, basilar   artery and posterior cerebral arteries demonstrate no occlusion or   stenosis. No evidence for aneurysm or arteriovenous malformation. INTRACRANIAL VENOUS SYSTEM: No evidence for intracranial venous   thrombosis. INTRACRANIAL HEMORRHAGE: None. VENTRICLES: Normal in size and configuration for age. BRAIN PARENCHYMA: Gray-white matter differentiation is normal. No   intracranial mass effect. SKULL: No destructive osseous process or fracture. PARANASAL SINUSES / MASTOIDS: No acute sinusitis or mastoiditis. ORBITS: Normal.      EXTRACRANIAL SOFT TISSUES: Normal.      OTHER: Patchy airspace disease, groundglass airspace disease noted   in both upper lobes and superior segment lower lobes. IMPRESSION:      1.  Suboptimal study due to early bolus

## 2018-07-16 NOTE — PROGRESS NOTES
dextrose, mometasone-formoterol      Intake/Output Summary (Last 24 hours) at 07/16/18 1039  Last data filed at 07/16/18 0644   Gross per 24 hour   Intake             2955 ml   Output             2700 ml   Net              255 ml       Physical Exam Performed:    BP (!) 150/81   Pulse 88   Temp 98.6 °F (37 °C) (Axillary)   Resp 20   Ht 6' (1.829 m)   Wt 247 lb 5.7 oz (112.2 kg)   SpO2 99%   BMI 33.55 kg/m²     General appearance:  morbidly obese  M, stimulated by voice but non-verbal  HEENT:  Normal cephalic, atraumatic without obvious deformity. Pupils equal, round, and reactive to light. Neck: Supple, with full range of motion. Trachea midline. Respiratory:  Normal respiratory effort. Mild crackles in lung bases bilaterally. Cardiovascular:  Tachycardic with irregular rhythm with normal S1/S2 without murmurs, rubs or gallops. Abdomen: Soft, non-tender, non-distended with normal bowel sounds. Peg tube in place  Musculoskeletal:  No clubbing, cyanosis and mild bilateral slightly pitting edema in hips. Multiple contractures. Skin: Skin color, texture, turgor normal. Sacral decubitus ulcer, healing over. 4x4 cm circular pressure sore on medial right calcaneus. Pressure sore on right scapula near armpit.    Neurologic:  sensation grossly intact bilaterally with right sided spastic hemiplegia.    Psychiatric:  LIMITED, patient nonverbal   Peripheral Pulses: +2 palpable, equal bilaterally       Labs:   Recent Labs      07/14/18   0442  07/16/18   0933   WBC  7.9  8.2   HGB  8.4*  8.6*   HCT  25.5*  27.4*   PLT  184  198     Recent Labs      07/14/18   0442  07/16/18   0933   NA  147*  140   K  4.2  5.2*   CL  102  97*   CO2  33*  31   BUN  78*  80*   CREATININE  1.5*  1.4*   CALCIUM  9.9  9.9     Recent Labs      07/13/18   1422  07/14/18   0442   AST  16  18   ALT  13  15   BILIDIR  <0.2   --    BILITOT  0.3  0.3   ALKPHOS  91  106     Recent Labs      07/14/18   0442   INR  1.48* cerebral arteries, and   middle cerebral arteries demonstrate no occlusion or severe   stenosis. No evidence for aneurysm or arteriovenous malformation. POSTERIOR CIRCULATION: The bilateral vertebral arteries, basilar   artery and posterior cerebral arteries demonstrate no occlusion or   stenosis. No evidence for aneurysm or arteriovenous malformation. INTRACRANIAL VENOUS SYSTEM: No evidence for intracranial venous   thrombosis. INTRACRANIAL HEMORRHAGE: None. VENTRICLES: Normal in size and configuration for age. BRAIN PARENCHYMA: Gray-white matter differentiation is normal. No   intracranial mass effect. SKULL: No destructive osseous process or fracture. PARANASAL SINUSES / MASTOIDS: No acute sinusitis or mastoiditis. ORBITS: Normal.      EXTRACRANIAL SOFT TISSUES: Normal.      OTHER: Patchy airspace disease, groundglass airspace disease noted   in both upper lobes and superior segment lower lobes. IMPRESSION:      1. Suboptimal study due to early bolus timing. There appears to be   normal patency of both vertebral arteries with the left vertebral   dominant without dissection or occlusion     2. Approximately 50% stenosis of the right cavernous carotid   artery and 50-70% stenosis on the left without occlusion. Normal   distal branching   3. Prior infarct in the right cerebellum with encephalomalacia and   left thalamus with encephalomalacia as described on prior CTs   without acute hemorrhage      CTA NECK W CONTRAST   Final Result      1. No flow-limiting stenosis or occlusion identified with   limitations described above. 2. Groundglass hazy opacities in both lungs, upper lobe regions   and superior segment lower lobe regions.    3. Previous infarction affecting the right cerebellum and left   thalamus with low-attenuation chronic ischemic changes of the   brain noted            PROCEDURE: CT ANGIOGRAPHY HEAD WITH/WITHOUT CONTRAST      INDICATION: possible stroke

## 2018-07-16 NOTE — CONSULTS
History of Present Illness:  Melia Aguilar is a 80 y.o. patient whom we were asked by the hospitalists to see for aflutter and new decline in LV function. Admitted after being found in NH in respiratory distress. Has hx of CVA and jain right hemiplegia and aphasia. Noted in ER to have atrial flutter. Echo done on this admit has new finding of mild LV dysfunction. Past Medical History:   has a past medical history of Aphasia following cerebral infarction; CAD (coronary artery disease); Cerebral artery occlusion with cerebral infarction 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 Surgical History:   has a past surgical history that includes Gastrostomy tube placement (N/A, 05/07/2018); colostomy; and Cardiac surgery. Social History:   reports that he has quit smoking. He has never used smokeless tobacco. He reports that he does not drink alcohol or use drugs. Family History:  No evidence for sudden cardiac death or premature CAD    Home Medications:  Were reviewed and are listed in nursing record. and/or listed below  Prior to Admission medications    Medication Sig Start Date End Date Taking?  Authorizing Provider   albuterol (PROVENTIL) (2.5 MG/3ML) 0.083% nebulizer solution Take 2.5 mg by nebulization every 6 hours as needed for Wheezing   Yes Historical Provider, MD   atorvastatin (LIPITOR) 40 MG tablet 40 mg by Per G Tube route nightly   Yes Historical Provider, MD   digoxin (LANOXIN) 125 MCG tablet 125 mcg by Per G Tube route daily   Yes Historical Provider, MD   ferrous gluconate (FERGON) 324 (38 Fe) MG tablet 324 mg by Per G Tube route 2 Allergies:  Nsaids     Review of Systems:   · Unable due to asphasia    Physical Examination:    Vitals:    07/16/18 1055   BP:    Pulse: 87   Resp:    Temp:    SpO2:     Weight: 247 lb 5.7 oz (112.2 kg)         General Appearance:  Awake, no obvious distress   Head:  Normocephalic, without obvious abnormality, atraumatic   Eyes:  Conjunctiva/corneas clear       Nose: Nares normal   Throat: Lips normal   Neck: Supple, symmetrical, trachea midline       Lungs:   Decreased BS, respirations unlabored   Chest Wall:  No tenderness or deformity   Heart:  Fairly regular rate and rhythm, S1, S2 normal, no murmur, rub or gallop   Abdomen:   Soft,bowel sounds active all four quadrants,             Extremities: Extremities normal, atraumatic, no cyanosis or edema   Pulses: 2+ and symmetric   Skin: Skin color, texture, turgor normal, no rashes or lesions   Pysch: Unable   Neurologic: Apahasia        Labs  CBC:   Lab Results   Component Value Date    WBC 8.2 07/16/2018    RBC 3.27 07/16/2018    HGB 8.6 07/16/2018    HCT 27.4 07/16/2018    MCV 83.9 07/16/2018    RDW 18.5 07/16/2018     07/16/2018     CMP:    Lab Results   Component Value Date     07/16/2018    K 5.2 07/16/2018    K 5.2 07/16/2018    CL 97 07/16/2018    CO2 31 07/16/2018    BUN 80 07/16/2018    CREATININE 1.4 07/16/2018    GFRAA >60 07/16/2018    AGRATIO 0.6 07/14/2018    LABGLOM 51 07/16/2018    GLUCOSE 146 07/16/2018    PROT 9.0 07/14/2018    CALCIUM 9.9 07/16/2018    BILITOT 0.3 07/14/2018    ALKPHOS 106 07/14/2018    AST 18 07/14/2018    ALT 15 07/14/2018     PT/INR:  No results found for: PTINR  Lab Results   Component Value Date    CKTOTAL 110 07/13/2018    TROPONINI 0.31 (H) 07/14/2018       EKG:  I have reviewed EKG with the following interpretation:  Impression:  Atrial flutter with 4:1 conduction, Poor R , NSSTTW changes.      Assessment  Patient Active Problem List   Diagnosis    Diastolic CHF ([address]    Essential hypertension   

## 2018-07-16 NOTE — PLAN OF CARE
Problem: Falls - Risk of:  Goal: Will remain free from falls  Will remain free from falls   Outcome: Ongoing  D)  Patient identified as fall risk. A)  Fall precautions in place and bed alarm in use. Flaccid and unable to get out of bed without assistance. Bed in locked and low position. R)  Remains free from injury and fall. Problem: Risk for Impaired Skin Integrity  Goal: Tissue integrity - skin and mucous membranes  Structural intactness and normal physiological function of skin and  mucous membranes. Outcome: Ongoing  Stephen scale score 11. Immobile and flaccid. Walsh intact. Colostomy intact. Multiple areas of skin breakdown. See flow sheets. Wet-dry dressing charge to sacrum as ordered. Mepilex to open wounds. DTI to heel YUNIOR. Remains on low air loss mattress. Mutipodus boots in place and heels elevated off mattress. Turning and repositioning Q2 hours using pillows and positioning wedge. Wound Care consult for today. Podiatry following for LE  wounds. Will continue to monitor, continue plan of care. Problem: Nutrition  Goal: Optimal nutrition therapy  Outcome: Ongoing  TF infusing as order. No residual.  HOB elevated to 30°. Monitoring BS Q4 hours. Will monitor. Problem: HEMODYNAMIC STATUS  Goal: Patient has stable vital signs and fluid balance  Outcome: Ongoing  VSS. Aflutter on Tele, rate controlled. No ectopy reported. Problem: FLUID AND ELECTROLYTE IMBALANCE  Goal: Fluid and electrolyte balance are achieved/maintained  Outcome: Ongoing  Monitoring intake and output. Walsh intact draining clear yellow urine. IV lasix given as ordered. Remains NPO. TF infusing as ordered. Increased free water boluses to 30 ml Q4 per order overnight. Will monitor. Problem: Discharge Planning:  Goal: Discharged to appropriate level of care  Discharged to appropriate level of care  Patient to return to [facility] place once medically stable.   Case Management involved for D/C planning.

## 2018-07-16 NOTE — PROGRESS NOTES
Negative   KETUA  Negative   SPECGRAV  1.010   BLOODU  LARGE*   PHUR  7.0  7.0   PROTEINU  100*   UROBILINOGEN  0.2   NITRU  Negative   LEUKOCYTESUR  Negative   LABMICR  YES   URINETYPE  Not Specified      Urine Microscopic:   Recent Labs      07/13/18   1150   WBCUA  0-2   RBCUA  20-50*     Urine Culture:   Recent Labs      07/13/18   2155   LABURIN  No growth at 18-36 hours     Urine Chemistry:   Recent Labs      07/14/18   2045   LABCREA  33.0*   PROTEINUR  67.90*       Objective:   Vitals: BP (!) 150/81   Pulse 88   Temp 98.6 °F (37 °C) (Axillary)   Resp 20   Ht 6' (1.829 m)   Wt 247 lb 5.7 oz (112.2 kg)   SpO2 99%   BMI 33.55 kg/m²    Wt Readings from Last 3 Encounters:   07/16/18 247 lb 5.7 oz (112.2 kg)   06/12/18 260 lb 11.2 oz (118.3 kg)   05/25/18 257 lb 6.4 oz (116.8 kg)      24HR INTAKE/OUTPUT:      Intake/Output Summary (Last 24 hours) at 07/16/18 1037  Last data filed at 07/16/18 0644   Gross per 24 hour   Intake             2955 ml   Output             2700 ml   Net              255 ml     Constitutional:  Alert   NECK: supple, no JVD  Cardiovascular:  S1, S2 without m/r/g  Respiratory:  CTA B without w/r/r  Abdomen: +bs, soft, nt  Ext: no LE edema    Assessment and Plan:       IMAGING:  XR FOOT RIGHT (MIN 3 VIEWS)   Final Result      1. No subcutaneous gas or bone destruction identified. 2. Mild degenerative changes of first metatarsal-phalangeal joint. Interpreted by:   Alexis Fofana MD      Signed by:   Alexis Fofana MD   7/14/18      Final result      CT CHEST WO CONTRAST   Final Result   Patchy groundglass opacities in all lung fields. Primary differential consideration would be atypical pneumonia   versus interstitial pulmonary edema. CTA HEAD W CONTRAST   Final Result      1. No flow-limiting stenosis or occlusion identified with   limitations described above.      2. Groundglass hazy opacities in both lungs, upper lobe regions   and superior segment lower VENTRICLES: Normal in size and configuration for age. BRAIN PARENCHYMA: Gray-white matter differentiation is normal. No   intracranial mass effect. SKULL: No destructive osseous process or fracture. PARANASAL SINUSES / MASTOIDS: No acute sinusitis or mastoiditis. ORBITS: Normal.      EXTRACRANIAL SOFT TISSUES: Normal.      OTHER: Patchy airspace disease, groundglass airspace disease noted   in both upper lobes and superior segment lower lobes. IMPRESSION:      1. Suboptimal study due to early bolus timing. There appears to be   normal patency of both vertebral arteries with the left vertebral   dominant without dissection or occlusion     2. Approximately 50% stenosis of the right cavernous carotid   artery and 50-70% stenosis on the left without occlusion. Normal   distal branching   3. Prior infarct in the right cerebellum with encephalomalacia and   left thalamus with encephalomalacia as described on prior CTs   without acute hemorrhage      XR CHEST PORTABLE   Final Result      No acute disease. Stable compared to prior study            CT Head WO Contrast (CODE STROKE)   Final Result      Prior right cerebellar infarction with volume loss and   low-attenuation. No acute hemorrhage or hydrocephalus. Moderate cerebral atrophy with previous infarct in left thalamus   noted and encephalomalacia      Soft tissue mass measuring 3 x 2.4 cm in the right occipital and   suboccipital soft tissues with skin thickening, correlate   clinically for area of active mass or infection, see image 16 of   the axial sequences.  This is stable compared to prior study,   correlate clinically          Assessment/Plan     DELORIS on CKD 3  - sec to CRS   - diuresis as jyotsna   - need to inc free water as Na worsening   - Monitor Lytes and renal function   - labs in am       Hyperkalemia   - Monitor   - if worse will need to change to low K TF     Hypernatremia   - replace Free water   - 250 cc q 4 hours Vimal Garcia MD  [facility] [address] [place], [place] [id number] 18 [id number]

## 2018-07-16 NOTE — PLAN OF CARE
Problem: Risk for Impaired Skin Integrity  Goal: Tissue integrity - skin and mucous membranes  Structural intactness and normal physiological function of skin and  mucous membranes. Outcome: Ongoing  Patient has poor tissue integrity and several pre-existing wounds. No new open tissue this shift. Problem: HEMODYNAMIC STATUS  Goal: Patient has stable vital signs and fluid balance  Outcome: Ongoing  Patient's vital signs have been stable this shift. Intake and output are being closely monitored. Will continue to monitor. Problem: Discharge Planning:  Goal: Discharged to appropriate level of care  Discharged to appropriate level of care   Outcome: Ongoing  Patient plans to return to [facility] place at discharge. Patient is cleared for return at time of discharge.

## 2018-07-16 NOTE — PROGRESS NOTES
Pt. With multiple pressure injuries present on admission, due to bedridden from severe CVA. 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 Spouse stating wound vAC removed form care as wound nurse in SNF allegedly stated \"wound too wet\". Recommend Aqauacel ag to fill cavity and sacral heart over. Left Trochanter measures 1.5L x 1.5W x 0.1 D cm unstageable due to 100% slough covered. Right elbow with unroofed blister stage 3 pressure ulcer most likely from friction measuring 2L x 2W x 0.1 D cm, covered in loose jan and skin, easily wiped away on cleansing with NS and gauze. Recommend same dressing for these. Very large double barrel loop colostomy to LLQ abdomen measuring 2 1/4 L x 2 1/2 W. Wafer and pouch change as skin seen around stoma with exposure to stool. 4 in. Cut to size on stoma and applied. Parastomal skin intact, functioning large  Loose brown stool.

## 2018-07-16 NOTE — PROGRESS NOTES
Podiatric Surgery Daily Progress Note  [facility]  Subjective :   Patient seen and examined this am at the bedside. Patient was not very responsive during encounter but awake. Review of Systems: A 12 point review of symptoms is unremarkable with the exception of the chief complaint. Patient specifically denies nausea, fever, vomiting, chills, shortness of breath, chest pain, abdominal pain, constipation or difficulty urinating. Objective     /72   Pulse 84   Temp 99.5 °F (37.5 °C) (Axillary)   Resp 20   Ht 6' (1.829 m)   Wt 247 lb 5.7 oz (112.2 kg)   SpO2 100%   BMI 33.55 kg/m²      I/O:    Intake/Output Summary (Last 24 hours) at 07/16/18 0707  Last data filed at 07/16/18 0644   Gross per 24 hour   Intake             2955 ml   Output             2850 ml   Net              105 ml              Wt Readings from Last 3 Encounters:   07/16/18 247 lb 5.7 oz (112.2 kg)   06/12/18 260 lb 11.2 oz (118.3 kg)   05/25/18 257 lb 6.4 oz (116.8 kg)       LABS:    Recent Labs      07/13/18   1029  07/14/18   0442   WBC  9.4  7.9   HGB  8.8*  8.4*   HCT  27.4*  25.5*   PLT  219  184        Recent Labs      07/13/18   1028  07/14/18   0442   NA  145  147*   K  5.1  4.2   CL  100  102   CO2  31  33*   PHOS  3.4   --    BUN  81*  78*   CREATININE  1.6*  1.5*        Recent Labs      07/13/18   1029  07/13/18   1422  07/14/18   0442   PROT   --   8.1  9.0*   INR  1.73*   --   1.48*           LOWER EXTREMITY EXAMINATION  VASCULAR: DP and PT pulses are palpable 1/4 b/l. CFT is brisk to the digits of the foot b/l. Skin temperature is warm to cool from proximal to distal with no focal calor noted. Mild nonpitting edema noted to the b/l lower extremity. No pain with calf compression b/l.     NEUROLOGIC: Unable to assess.     DERMATOLOGIC: Necrotic eschar noted to the plantarmedial aspect of the right heel, measuring approximately 5x4.3cm.   Healed eschar noted to the lateral aspect on the plantar medial of right heel

## 2018-07-17 PROBLEM — I48.92 ATRIAL FLUTTER (HCC): Status: ACTIVE | Noted: 2018-01-01

## 2018-07-17 NOTE — PROGRESS NOTES
Nutrition Assessment    Type and Reason for Visit: Reassess    Nutrition Recommendations:   · Continue Glucerna [email or web address]   · Continue [email or web address], BID   · Monitor weights, labs, and clinical progress     Malnutrition Assessment:  · Malnutrition Status: No malnutrition  · Context: Chronic illness  · Findings of the 6 clinical characteristics of malnutrition (Minimum of 2 out of 6 clinical characteristics is required to make the diagnosis of moderate or severe Protein Calorie Malnutrition based on AND/ASPEN Guidelines):  1. Energy Intake-Less than or equal to 75%,  (since admit, EN meeting needs pta )    2. Weight Loss-No significant weight loss,    3. Fat Loss-Unable to assess,    4. Muscle Loss-Unable to assess,    5. Fluid Accumulation-No significant fluid accumulation,    6.  Strength-Not measured    Nutrition Diagnosis:   · Problem: Inadequate oral intake  · Etiology: related to Lack of self-feeding ability     Signs and symptoms:  as evidenced by Nutrition support - EN    Nutrition Assessment:  · Subjective Assessment: Pt on Glucerna 1.2 @ 80ml/hr x 24 hrs w/ 2 protein modulars/day. CBW of 250lb 3.6oz. Wound vac in place.    · Nutrition-Focused Physical Findings: non-pitting generalized edema and BUE/BLE; colostomy in place w/ no output ~24 hrs; wound vac  · Wound Type: Multiple, Stage IV, Unstageable, Pressure Ulcer, Deep Tissue Injury  · Current Nutrition Therapies:  · Oral Diet Orders: NPO   · Oral Diet intake: NPO  · Oral Nutrition Supplement (ONS) Orders: None  · ONS intake: NPO  · Anthropometric Measures:  · Ht: 6' (182.9 cm)   · Current Body Wt: 250 lb 3.6 oz (113.5 kg)  · Admission Body Wt: 252 lb 13.9 oz (114.7 kg)  · Usual Body Wt: 257 lb (116.6 kg) (to 277lb )  · Ideal Body Wt: 178 lb (80.7 kg),   · BMI Classification: BMI 30.0 - 34.9 Obese Class I  · Comparative Standards (Estimated Nutrition Needs):  · Estimated Daily Total Kcal: 0073-8719  · Estimated Daily Protein (g): 145-186  · Tube

## 2018-07-17 NOTE — PROGRESS NOTES
Podiatric Surgery Daily Progress Note  Mitzi Flaherty  Subjective :   Patient seen and examined this am at the bedside. Patient was not very responsive during encounter but awake and followed movements with eyes. Review of Systems: unable to obtain due to mental status       Objective     /74   Pulse 91   Temp 97.9 °F (36.6 °C) (Axillary)   Resp 18   Ht 6' (1.829 m)   Wt 250 lb 3.6 oz (113.5 kg)   SpO2 97%   BMI 33.94 kg/m²      I/O:    Intake/Output Summary (Last 24 hours) at 07/17/18 0941  Last data filed at 07/17/18 0514   Gross per 24 hour   Intake             2923 ml   Output             1975 ml   Net              948 ml              Wt Readings from Last 3 Encounters:   07/17/18 250 lb 3.6 oz (113.5 kg)   06/12/18 260 lb 11.2 oz (118.3 kg)   05/25/18 257 lb 6.4 oz (116.8 kg)       LABS:    Recent Labs      07/16/18   0933   WBC  8.2   HGB  8.6*   HCT  27.4*   PLT  198        Recent Labs      07/16/18   0933   NA  140   K  5.2*  5.2*   CL  97*   CO2  31   BUN  80*   CREATININE  1.4*        No results for input(s): PROT, INR, APTT in the last 72 hours. LOWER EXTREMITY EXAMINATION  VASCULAR: DP and PT pulses are palpable 1/4 b/l. CFT is brisk to the digits of the foot b/l. Skin temperature is warm to cool from proximal to distal with no focal calor noted. Mild nonpitting edema noted to the b/l lower extremity. No pain with calf compression b/l.     NEUROLOGIC: Unable to assess.     DERMATOLOGIC: Necrotic eschar noted to the plantarmedial aspect of the right heel, measuring approximately 5x4.3cm. Healed eschar noted to the lateral aspect on the plantar medial of right heel with associated soft tissue hypertrophy and step-off. No clinical signs of infection noted to the RLE. No active drainage, erythema, probing, other open wounds or tracking is noted Nails 1-5 b/l are thickened, dystrophic, elongated. Webspaces 1-4 b/l are clean, dry, and intact.  No subcutaneous nodules, rashes, or other

## 2018-07-17 NOTE — PROGRESS NOTES
.  Internal Medicine PGY-1 Resident Progress Note        PCP: Damon Leo    Date of Admission: 7/13/2018    Chief Complaint: [address] Course:  Afib (on eliquis and digoxin), DM and CKD stage 3 who presented with AMS. Patient has been at [facility] since 6/18 after being discharged for sepsis, aspiration pneumonia. [facility] state they found patient in his room in respiratory distress and unresponsive to verbal stimuli. In the ED, EKG revealed atrial flutter with variable AV block with LAD. He was weaned to nasal cannula from non-rebreather. CT head was stable when compared to prior. CXR and CTA revealed ground glass hazy opacities (upper lobe and superior segments). Cr was elevated to 1.6. Troponin was 0.29. Echocardiogram ordered to evaluate his heart function, EF down to 40%. Patient remained afebrile with no leukocytosis throughout admission. Lactate WNL. Subjective: Patient was seen in his room today. No acute events overnight. Social work has spoken to patient's nursing facility who agreed to take patient back today.         Medications:  Reviewed    Infusion Medications    dextrose       Scheduled Medications    furosemide  40 mg Intravenous BID    insulin lispro  0-18 Units Subcutaneous Q4H    apixaban  5 mg Per G Tube BID    atorvastatin  40 mg Per G Tube Nightly    digoxin  125 mcg Per G Tube Daily    diltiazem  60 mg Per G Tube 4x Daily    metoprolol  100 mg Per G Tube BID    polyethylene glycol  17 g Per G Tube Daily    sennosides-docusate sodium  2 tablet Per G Tube BID    sodium chloride flush  10 mL Intravenous 2 times per day    insulin glargine  40 Units Subcutaneous Nightly    pantoprazole  40 mg Intravenous Daily    ferrous gluconate  324 mg Per G Tube BID     PRN Meds: albuterol, acetaminophen, acetaminophen, ipratropium-albuterol, sodium chloride flush, magnesium hydroxide, ondansetron, glucose, dextrose, glucagon (rDNA), dextrose, 05/27/2018    RBCUA 20-50 07/13/2018    BLOODU LARGE 07/13/2018    SPECGRAV 1.010 07/13/2018    GLUCOSEU Negative 07/13/2018       Radiology:  XR FOOT RIGHT (MIN 3 VIEWS)   Final Result      1. No subcutaneous gas or bone destruction identified. 2. Mild degenerative changes of first metatarsal-phalangeal joint. Interpreted by:   Dede Waller MD      Signed by:   Dede Waller MD   7/14/18      Final result      CT CHEST WO CONTRAST   Final Result   Patchy groundglass opacities in all lung fields. Primary differential consideration would be atypical pneumonia   versus interstitial pulmonary edema. CTA HEAD W CONTRAST   Final Result      1. No flow-limiting stenosis or occlusion identified with   limitations described above. 2. Groundglass hazy opacities in both lungs, upper lobe regions   and superior segment lower lobe regions. 3. Previous infarction affecting the right cerebellum and left   thalamus with low-attenuation chronic ischemic changes of the   brain noted            PROCEDURE: CT ANGIOGRAPHY HEAD WITH/WITHOUT CONTRAST      INDICATION: possible stroke      COMPARISON: none      TECHNIQUE: Axial CT imaging obtained through the head prior to and   following administration of IV contrast. Axial images, multiplanar   reformatted images, and maximum intensity projection images were   reviewed for CT angiographic technique. IV contrast: 80 mL Omnipaque 350. FINDINGS:      ANTERIOR CIRCULATION: The intracranial internal carotid arteries   reveal some atherosclerotic plaquing in the cavernous carotid   areas with approximately 70 % stenosis on the left side and less   than 50% stenosis on the right side. There is normal patency of the anterior cerebral arteries, and   middle cerebral arteries demonstrate no occlusion or severe   stenosis. No evidence for aneurysm or arteriovenous malformation.       POSTERIOR CIRCULATION: The bilateral vertebral arteries, basilar were   reviewed for CT angiographic technique. IV contrast: 80 mL Omnipaque 350. FINDINGS:      ANTERIOR CIRCULATION: The intracranial internal carotid arteries   reveal some atherosclerotic plaquing in the cavernous carotid   areas with approximately 70 % stenosis on the left side and less   than 50% stenosis on the right side. There is normal patency of the anterior cerebral arteries, and   middle cerebral arteries demonstrate no occlusion or severe   stenosis. No evidence for aneurysm or arteriovenous malformation. POSTERIOR CIRCULATION: The bilateral vertebral arteries, basilar   artery and posterior cerebral arteries demonstrate no occlusion or   stenosis. No evidence for aneurysm or arteriovenous malformation. INTRACRANIAL VENOUS SYSTEM: No evidence for intracranial venous   thrombosis. INTRACRANIAL HEMORRHAGE: None. VENTRICLES: Normal in size and configuration for age. BRAIN PARENCHYMA: Gray-white matter differentiation is normal. No   intracranial mass effect. SKULL: No destructive osseous process or fracture. PARANASAL SINUSES / MASTOIDS: No acute sinusitis or mastoiditis. ORBITS: Normal.      EXTRACRANIAL SOFT TISSUES: Normal.      OTHER: Patchy airspace disease, groundglass airspace disease noted   in both upper lobes and superior segment lower lobes. IMPRESSION:      1. Suboptimal study due to early bolus timing. There appears to be   normal patency of both vertebral arteries with the left vertebral   dominant without dissection or occlusion     2. Approximately 50% stenosis of the right cavernous carotid   artery and 50-70% stenosis on the left without occlusion. Normal   distal branching   3. Prior infarct in the right cerebellum with encephalomalacia and   left thalamus with encephalomalacia as described on prior CTs   without acute hemorrhage      XR CHEST PORTABLE   Final Result      No acute disease.       Stable compared to prior study ischemia. Troponin now trending down.     Pressure sores  - wound care consulted  - podiatry consulted      DMII  - insulin glargine 40U qHS  - SSI  - accuchecks qAC and qHS  - hypoglycemia protocol     DVT Prophylaxis: eliquis  Diet: DIET TUBE FEED CONTINUOUS/CYCLIC NPO; Diabetic (Glucerna 1.2); Gastrostomy; Continuous; 25; 80; 24  Code Status: Full Code    PT/OT Eval Status: PT score 6/24    Dispo - F    Cash Winslow DO    I will discuss the patient with the senior resident and MD Yadi Robles DO   Internal Medicine Resident PGY-1  Pager: [phone]    Addendum to the Resident  notes. Pt seen,examined and evaluated at bed side with the  resident. I have reviewed the current history, physical findings, labs and assessment and plan and agree with note as documented . Plan d/w pt/family and RN at bed side. Dispo: Will monitor in floor   Discharge in 1  days  Prognosis- guarded    Rito Cat M.D

## 2018-07-17 NOTE — PROGRESS NOTES
Report called to Aruna Raphael RN at [facility]. All questions answered.  Electronically signed by Florinda Santos RN on 7/17/2018 at 1:21 PM

## 2018-07-17 NOTE — PROGRESS NOTES
Transport company running late.  Electronically signed by Georgiana Adames RN on 7/17/2018 at 5:34 PM

## 2018-07-17 NOTE — PROGRESS NOTES
[address] Daily Progress Note      Admit Date:  7/13/2018    Subjective:  Mr. Palomo Baker was seen and examined. F/U cardiomyopathy/a flutter/MS changes.   Not real responsive this am.     Objective:   /74   Pulse 91   Temp 97.9 °F (36.6 °C) (Axillary)   Resp 18   Ht 6' (1.829 m)   Wt 250 lb 3.6 oz (113.5 kg)   SpO2 97%   BMI 33.94 kg/m²     Intake/Output Summary (Last 24 hours) at 07/17/18 1040  Last data filed at 07/17/18 0514   Gross per 24 hour   Intake             2923 ml   Output             1975 ml   Net              948 ml       TELEMETRY: Atrial flutter     Physical Exam:  General: Asleep, NAD  Skin:  Warm and dry  Neck:  JVP difficult to assess  Chest:  Decreased BS, respiration normal  Cardiovascular:  Fairly regular RR S1S2  Abdomen:  Soft quiet  Extremities:  no edema    Medications:    furosemide  40 mg Intravenous BID    insulin lispro  0-18 Units Subcutaneous Q4H    apixaban  5 mg Per G Tube BID    atorvastatin  40 mg Per G Tube Nightly    digoxin  125 mcg Per G Tube Daily    diltiazem  60 mg Per G Tube 4x Daily    metoprolol  100 mg Per G Tube BID    polyethylene glycol  17 g Per G Tube Daily    sennosides-docusate sodium  2 tablet Per G Tube BID    sodium chloride flush  10 mL Intravenous 2 times per day    insulin glargine  40 Units Subcutaneous Nightly    pantoprazole  40 mg Intravenous Daily    ferrous gluconate  324 mg Per G Tube BID      dextrose       albuterol, acetaminophen, acetaminophen, ipratropium-albuterol, sodium chloride flush, magnesium hydroxide, ondansetron, glucose, dextrose, glucagon (rDNA), dextrose, mometasone-formoterol    Lab Data:  CBC: Recent Labs      07/16/18   0933   WBC  8.2   HGB  8.6*   HCT  27.4*   MCV  83.9   PLT  198     BMP: Recent Labs      07/16/18   0933   NA  140   K  5.2*  5.2*   CL  97*   CO2  31   BUN  80*   CREATININE  1.4*     LIVER PROFILE: No results for input(s): AST, ALT, LIPASE, BILIDIR, BILITOT, ALKPHOS in the last accident) 04/01/2018    DNR (do not resuscitate) discussion     Encounter for palliative care     Thyromegaly     Diastolic CHF ([facility] 47/67/8524    Essential hypertension 10/21/2016    Dyspnea on exertion 10/21/2016    Diabetes 1.5, managed as type 2 ([facility] 10/21/2016       Plan:  1. Minimally responsive early this am.  Volume status reasonable. Medical tx. Continue Metoprolol. Will add low dose lisinopril. HR controlled. Continue Dig/metoprolol/dilt.        Core Measures:  · Discharge instructions:   · LVEF documented:   · ACEI for LV dysfunction:   · Smoking Cessation:    Kailey Bernardo MD 7/17/2018 10:40 AM

## 2018-07-17 NOTE — CARE COORDINATION
2018  [facility] Case Management Department    Patient: Kristofer Castle  MRN: [id number] / : [id number]  ACCT: [de-identified]    Emergency Contacts  Contact 1: Name: Reynaldo Mata 1: Number: [phone]  Contact 1: Relationship: wife/POA  Contact 2: Name: Hamlet Adkins  Contact 2: Number: [phone]  Contact 2: Relationship: sister                                                   son  Healthcare Agent's Name: Sandra"[employer] Agent's Phone Number: [phone]    Admission Documentation  Attending Provider: Sharon Quiroz MD  Admit date/time: 2018  1:50 PM  Status: Inpatient  Diagnosis: Altered mental status     Readmission within last 30 days:  no     Living Situation  Discharge Planning  [facility]: <More Facilities>  Living Arrangements: Other (Comment) (ECF)  Support Systems: Children, Family Members, Spouse/Significant Other  Potential Assistance Needed: Extended Care Placement  DME: Wheelchair, Hospital Bed, [employer] Feedings  Type of Home Care Services: Aide Services, Nursing Services, [place], PT  Patient expects to be discharged to[de-identified] [facility]   Expected Discharge Date: 18    Service Assessment:       Values / Beliefs  Do you have any ethnic, cultural, sacramental, or spiritual Bahai needs you would like us to be aware of while you are in the hospital?: No (steven)    Advance Directives (For Healthcare)  Pre-existing DNR Comfort Care/DNR Arrest/DNI Order: No  Healthcare Directive: Yes, patient has an advance directive for healthcare treatment  Type of Healthcare Directive: Durable power of  for health care, Living will  Copy in Chart: Yes, copy in chart  Chart Copy Status [de-identified] [address] Agent's Name: 657"[employer] Agent's Phone Number: [phone]    Pharmacy: [address] will fill all scripts at D/C   Potential

## 2018-07-24 NOTE — TELEPHONE ENCOUNTER
This recorder called pt's home # to confirmed a [facility] w/KV tomorrow, 18, and was informed by the spouse that pt  this morning. Deepest condolences relayed to surviving spouse.

## 2018-09-07 NOTE — ED PROVIDER NOTES
ED Attending Attestation Note     Date of evaluation: 7/13/2018    This patient was seen by the resident. I have seen and examined the patient, agree with the workup, evaluation, management and diagnosis. The care plan has been discussed. I have reviewed the ECG and concur with the resident's interpretation. My assessment reveals a somewhat chronically ill-appearing elderly patient, slightly tachypneic, but otherwise in no acute respiratory distress. He is brought in on a nonrebreather facemask, but was quickly able to be titrated down to nasal cannula oxygen supplementation. He does have multiple contractures and pressure ulcerations. He seems to be minimally verbal at baseline, but now is poorly responsive and nonverbal, although without clear new focal neurologic deficits. On my review of the patient's CTA, there is no evidence of large vessel occlusion, particularly in the posterior circulation. He does seem to have likely worsening of his known CHF, and a moderately elevated troponin, although without evidence of ST elevation MI on the EKG.           Martín Montgomery MD  09/07/18 5349